# Patient Record
Sex: FEMALE | Race: BLACK OR AFRICAN AMERICAN | NOT HISPANIC OR LATINO | Employment: UNEMPLOYED | URBAN - METROPOLITAN AREA
[De-identification: names, ages, dates, MRNs, and addresses within clinical notes are randomized per-mention and may not be internally consistent; named-entity substitution may affect disease eponyms.]

---

## 2017-08-04 ENCOUNTER — TELEPHONE (OUTPATIENT)
Dept: OBSTETRICS AND GYNECOLOGY | Facility: CLINIC | Age: 52
End: 2017-08-04

## 2017-08-04 DIAGNOSIS — Z12.31 VISIT FOR SCREENING MAMMOGRAM: Primary | ICD-10-CM

## 2017-08-04 NOTE — TELEPHONE ENCOUNTER
Pt is calling for mammogram orders . I placed the orders in the system and I will call the pt to schedule the apt.

## 2017-08-04 NOTE — TELEPHONE ENCOUNTER
----- Message from Matthew Whittington sent at 8/4/2017  1:31 PM CDT -----  Contact: Pt  X_ 1st Request  _ 2nd Request  _ 3rd Request    Who: JACQUES,PHOENESHIA L [[id number]]    Why: Patient would like to have her Mammogram orders put in    What Number to Call Back: [phone]    When to Expect a call back: (Before the end of the day)  -- if call after 3:00 call back will be tomorrow.

## 2017-09-12 ENCOUNTER — HOSPITAL ENCOUNTER (OUTPATIENT)
Dept: RADIOLOGY | Facility: OTHER | Age: 52
Discharge: HOME | End: 2017-09-12
Attending: OBSTETRICS & GYNECOLOGY
Payer: COMMERCIAL

## 2017-09-12 VITALS — WEIGHT: 176 LBS | BODY MASS INDEX: 30.05 KG/M2 | HEIGHT: 64 IN

## 2017-09-12 DIAGNOSIS — Z12.31 VISIT FOR SCREENING MAMMOGRAM: ICD-10-CM

## 2017-09-12 PROCEDURE — 77063 BREAST TOMOSYNTHESIS BI: CPT | Mod: 26,,, | Performed by: RADIOLOGY

## 2017-09-12 PROCEDURE — 77067 SCR MAMMO BI INCL CAD: CPT | Mod: 26,,, | Performed by: RADIOLOGY

## 2017-09-12 PROCEDURE — 77067 SCR MAMMO BI INCL CAD: CPT | Mod: TC

## 2017-12-07 ENCOUNTER — OFFICE VISIT (OUTPATIENT)
Dept: OBSTETRICS AND GYNECOLOGY | Facility: CLINIC | Age: 52
End: 2017-12-07
Payer: COMMERCIAL

## 2017-12-07 VITALS
HEIGHT: 64 IN | SYSTOLIC BLOOD PRESSURE: 110 MMHG | WEIGHT: 170 LBS | BODY MASS INDEX: 29.02 KG/M2 | DIASTOLIC BLOOD PRESSURE: 70 MMHG

## 2017-12-07 DIAGNOSIS — Z12.31 VISIT FOR SCREENING MAMMOGRAM: Primary | ICD-10-CM

## 2017-12-07 DIAGNOSIS — F41.8 DEPRESSION WITH ANXIETY: ICD-10-CM

## 2017-12-07 DIAGNOSIS — Z01.419 ENCOUNTER FOR GYNECOLOGICAL EXAMINATION WITHOUT ABNORMAL FINDING: ICD-10-CM

## 2017-12-07 PROCEDURE — 99396 PREV VISIT EST AGE 40-64: CPT | Mod: S$GLB,,, | Performed by: OBSTETRICS & GYNECOLOGY

## 2017-12-07 PROCEDURE — 99999 PR PBB SHADOW E&M-EST. PATIENT-LVL III: CPT | Mod: PBBFAC,,, | Performed by: OBSTETRICS & GYNECOLOGY

## 2017-12-07 RX ORDER — MELOXICAM 15 MG/1
TABLET ORAL
Refills: 0 | COMMUNITY
Start: 2017-10-28 | End: 2021-06-17

## 2017-12-07 NOTE — PROGRESS NOTES
HISTORY OF PRESENT ILLNESS:    Phoeneshia L Jacques is a 52 y.o. female, , Patient's last menstrual period was 2017.,  presents for a routine exam and has no complaints.  Cycles every 1-3 months, lasting 5-7 days using 3-4 pads per day.   Patient she does reports history uterine fibroids.     Past Medical History:   Diagnosis Date    Anxiety     Asthma     Depression        Past Surgical History:   Procedure Laterality Date    BREAST BIOPSY      essure      TUBAL LIGATION       MEDICATIONS AND ALLERGIES:    Current Outpatient Prescriptions:     hydrocodone-acetaminophen 5-325mg (NORCO) 5-325 mg per tablet, Take 1 tablet by mouth every 6 (six) hours as needed for Pain., Disp: 45 tablet, Rfl: 0    meloxicam (MOBIC) 15 MG tablet, TK 1 T PO ONCE D WITH FOOD., Disp: , Rfl: 0    gabapentin (NEURONTIN) 300 MG capsule, Take 1 capsule (300 mg total) by mouth 2 (two) times daily., Disp: 60 capsule, Rfl: 3    No Known Allergies    Family History   Problem Relation Age of Onset    Depression Mother     Bipolar disorder Mother     Schizophrenia Mother     Heart disease Mother     Hypertension Mother     Cancer Paternal Aunt        Social History     Social History    Marital status:      Spouse name: N/A    Number of children: 4    Years of education: N/A     Occupational History      Not Employed     Social History Main Topics    Smoking status: Never Smoker    Smokeless tobacco: Never Used    Alcohol use No    Drug use: No    Sexual activity: Yes     Partners: Male     Other Topics Concern    Not on file     Social History Narrative    No narrative on file       COMPREHENSIVE GYN HISTORY:  PAP History: Denies abnormal Paps.  Infection History: Denies STDs. Denies PID.  Benign History: Denies uterine fibroids. Denies ovarian cysts. Denies endometriosis. Denies other conditions.  Cancer History: Denies cervical cancer. Denies uterine cancer or hyperplasia. Denies ovarian cancer. Denies  "vulvar cancer or pre-cancer. Denies vaginal cancer or pre-cancer. Denies breast cancer. Denies colon cancer.  Sexual Activity History: Reports currently being sexually active  Menstrual History: Monthly. Mod then light flow.   Dysmenorrhea History: Reports mild dysmenorrhea.     ROS:  GENERAL: No weight changes. No swelling. No fatigue. No fever.  CARDIOVASCULAR: No chest pain. No shortness of breath. No leg cramps.   NEUROLOGICAL: No headaches. No vision changes.  BREASTS: No pain. No lumps. No discharge.  ABDOMEN: No pain. No nausea. No vomiting. No diarrhea. No constipation.  REPRODUCTIVE: No abnormal bleeding.   VULVA: No pain. No lesions. No itching.  VAGINA: No relaxation. No itching. No odor. No discharge. No lesions.  URINARY: No incontinence. No nocturia. No frequency. No dysuria.    /70   Ht 5' 4" (1.626 m)   Wt 77.1 kg (170 lb)   LMP 08/28/2017   BMI 29.18 kg/m²   PE:  APPEARANCE: Well nourished, well developed, in no acute distress.  AFFECT: WNL, alert and oriented x 3.  SKIN: No acne or hirsutism.  NECK: Neck symmetric, without masses or thyromegaly.  NODES: No inguinal, cervical, axillary or femoral lymph node enlargement.  CHEST: Good respiratory effort.   ABDOMEN: Soft. No tenderness or masses. No hepatosplenomegaly. No hernias.  BREASTS: Symmetrical, no skin changes, visible lesions, palpable masses or nipple discharge bilaterally.  PELVIC: External female genitalia without lesions.  Female hair distribution. Adequate perineal body, Normal urethral meatus. Vagina moist and well rugated without lesions or discharge.  No significant cystocele or rectocele present. Cervix pink without lesions, discharge or tenderness. Uterus is 10 week size, regular, mobile and nontender. Adnexa without masses or tenderness.  EXTREMITIES: No edema    DIAGNOSIS:  1. Visit for screening mammogram    2. Encounter for gynecological examination without abnormal finding    3. Depression with anxiety      PLAN:     "     COUNSELING:  The patient was counseled today on:  -A.C.S. Pap and pelvic exam guidelines (pap every 3 years), recomendations for yearly mammogram;  -to follow up with her PCP for other health maintenance.    FOLLOW-UP with me annually.

## 2018-10-08 ENCOUNTER — TELEPHONE (OUTPATIENT)
Dept: OBSTETRICS AND GYNECOLOGY | Facility: CLINIC | Age: 53
End: 2018-10-08

## 2018-10-08 DIAGNOSIS — Z12.31 VISIT FOR SCREENING MAMMOGRAM: Primary | ICD-10-CM

## 2018-10-08 NOTE — TELEPHONE ENCOUNTER
----- Message from Eitan Chan MA sent at 10/8/2018  2:51 PM CDT -----  Contact: Self  Pt is requesting mmg orders.  The pt can be reached at [phone].  Thanks [place]

## 2018-10-16 ENCOUNTER — HOSPITAL ENCOUNTER (OUTPATIENT)
Dept: RADIOLOGY | Facility: OTHER | Age: 53
Discharge: HOME | End: 2018-10-16
Attending: OBSTETRICS & GYNECOLOGY
Payer: COMMERCIAL

## 2018-10-16 DIAGNOSIS — Z12.31 VISIT FOR SCREENING MAMMOGRAM: ICD-10-CM

## 2018-10-16 PROCEDURE — 77067 SCR MAMMO BI INCL CAD: CPT | Mod: 26,,, | Performed by: RADIOLOGY

## 2018-10-16 PROCEDURE — 77063 BREAST TOMOSYNTHESIS BI: CPT | Mod: 26,,, | Performed by: RADIOLOGY

## 2018-10-16 PROCEDURE — 77063 BREAST TOMOSYNTHESIS BI: CPT | Mod: TC

## 2019-04-02 ENCOUNTER — HOSPITAL ENCOUNTER (EMERGENCY)
Facility: OTHER | Age: 54
Discharge: HOME | End: 2019-04-02
Attending: EMERGENCY MEDICINE
Payer: COMMERCIAL

## 2019-04-02 VITALS
BODY MASS INDEX: 28.51 KG/M2 | SYSTOLIC BLOOD PRESSURE: 121 MMHG | RESPIRATION RATE: 14 BRPM | DIASTOLIC BLOOD PRESSURE: 70 MMHG | WEIGHT: 167 LBS | HEIGHT: 64 IN | TEMPERATURE: 99 F | OXYGEN SATURATION: 100 % | HEART RATE: 70 BPM

## 2019-04-02 DIAGNOSIS — M25.552 HIP PAIN, ACUTE, LEFT: ICD-10-CM
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
## 2019-04-02 PROCEDURE — 63600175 PHARM REV CODE 636 W HCPCS: Performed by: EMERGENCY MEDICINE

## 2019-04-02 PROCEDURE — 96372 THER/PROPH/DIAG INJ SC/IM: CPT

## 2019-04-02 PROCEDURE — 99284 EMERGENCY DEPT VISIT MOD MDM: CPT | Mod: 25

## 2019-04-02 RX ORDER — ACETAMINOPHEN AND CODEINE PHOSPHATE 300; 30 MG/1; MG/1
TABLET ORAL
COMMUNITY
End: 2021-06-17

## 2019-04-02 RX ORDER — POTASSIUM CHLORIDE 750 MG/1
20 TABLET, EXTENDED RELEASE ORAL 2 TIMES DAILY
COMMUNITY
End: 2022-05-25

## 2019-04-02 RX ORDER — ZOLPIDEM TARTRATE 10 MG/1
5 TABLET ORAL NIGHTLY PRN
COMMUNITY
End: 2021-06-17

## 2019-04-02 RX ORDER — HYDROCHLOROTHIAZIDE 25 MG/1
25 TABLET ORAL DAILY
COMMUNITY
End: 2022-05-25

## 2019-04-02 RX ORDER — KETOROLAC TROMETHAMINE 30 MG/ML
15 INJECTION, SOLUTION INTRAMUSCULAR; INTRAVENOUS
Status: COMPLETED | OUTPATIENT
Start: 2019-04-02 | End: 2019-04-02

## 2019-04-02 RX ADMIN — KETOROLAC TROMETHAMINE 15 MG: 30 INJECTION, SOLUTION INTRAMUSCULAR at 04:04
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
## 2019-04-02 NOTE — ED NOTES
"Pt able to bear full weight on left leg and ambulate with cane with minimal pain. States "its just sore". Pt updated on xray results.   "
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
## 2019-04-02 NOTE — ED NOTES
Dr. Conner cleared pt of c-spine precautions, MD removed c collar. Pt tolerated well. Able to move neck freely.

## 2019-11-07 LAB — CRC RECOMMENDATION EXT: NORMAL

## 2020-01-30 ENCOUNTER — TELEPHONE (OUTPATIENT)
Dept: OBSTETRICS AND GYNECOLOGY | Facility: CLINIC | Age: 55
End: 2020-01-30

## 2020-01-30 NOTE — TELEPHONE ENCOUNTER
----- Message from Tabitha Buck sent at 1/30/2020 12:22 PM CST -----  Contact: pt  Name of Who is Calling: pt    What is the request in detail: is needing a referral for a PCP for her daughter. Please contact to further discuss and advise      Can the clinic reply by [facility]: no    What [facility]: [phone]

## 2020-01-30 NOTE — TELEPHONE ENCOUNTER
I spoke to the pt and gave her a few names of PCPs that she can call to schedule an appt for her daughter.

## 2020-06-17 ENCOUNTER — TELEPHONE (OUTPATIENT)
Dept: OBSTETRICS AND GYNECOLOGY | Facility: CLINIC | Age: 55
End: 2020-06-17

## 2020-06-17 NOTE — TELEPHONE ENCOUNTER
----- Message from Lavern Aaron sent at 6/17/2020 10:15 AM CDT -----  Regarding: wants to reschedule  Type: Patient Call Back    Who called:pt    What is the request in detail:pt is calling to reschedule appt. Nothing available to schedule. Call pt    Can the clinic reply by [facility]?    Would the patient rather a call back or a response via [facility]? call    Best call back number:[phone] (home)       Additional Information:

## 2020-12-16 ENCOUNTER — TELEPHONE (OUTPATIENT)
Dept: OBSTETRICS AND GYNECOLOGY | Facility: CLINIC | Age: 55
End: 2020-12-16

## 2020-12-16 NOTE — TELEPHONE ENCOUNTER
----- Message from Yuly Velazquez sent at 12/16/2020  2:10 PM CST -----      Name of Who is Calling: JACQUES, PHOENESHIA L [[id number]]      What is the request in detail: Pt called to schedule WWE.Please contact to further discuss and advise.          Can the clinic reply by [facility]: N      [facility]: [phone]

## 2021-06-17 ENCOUNTER — OFFICE VISIT (OUTPATIENT)
Dept: OBSTETRICS AND GYNECOLOGY | Facility: CLINIC | Age: 56
End: 2021-06-17
Payer: COMMERCIAL

## 2021-06-17 VITALS
WEIGHT: 189.38 LBS | SYSTOLIC BLOOD PRESSURE: 120 MMHG | BODY MASS INDEX: 32.33 KG/M2 | DIASTOLIC BLOOD PRESSURE: 60 MMHG | HEIGHT: 64 IN

## 2021-06-17 DIAGNOSIS — F41.8 DEPRESSION WITH ANXIETY: ICD-10-CM

## 2021-06-17 DIAGNOSIS — Z12.31 VISIT FOR SCREENING MAMMOGRAM: Primary | ICD-10-CM

## 2021-06-17 DIAGNOSIS — Z01.419 ENCOUNTER FOR GYNECOLOGICAL EXAMINATION WITHOUT ABNORMAL FINDING: ICD-10-CM

## 2021-06-17 PROBLEM — M16.12 PRIMARY OSTEOARTHRITIS OF LEFT HIP: Status: ACTIVE | Noted: 2018-09-24

## 2021-06-17 PROBLEM — G44.209 TENSION TYPE HEADACHE: Status: ACTIVE | Noted: 2020-05-07

## 2021-06-17 PROBLEM — I10 ESSENTIAL HYPERTENSION: Status: ACTIVE | Noted: 2018-09-24

## 2021-06-17 PROBLEM — E78.2 MIXED HYPERLIPIDEMIA: Status: ACTIVE | Noted: 2018-10-03

## 2021-06-17 PROCEDURE — 1126F PR PAIN SEVERITY QUANTIFIED, NO PAIN PRESENT: ICD-10-PCS | Mod: S$GLB,,, | Performed by: OBSTETRICS & GYNECOLOGY

## 2021-06-17 PROCEDURE — 99396 PR PREVENTIVE VISIT,EST,40-64: ICD-10-PCS | Mod: S$GLB,,, | Performed by: OBSTETRICS & GYNECOLOGY

## 2021-06-17 PROCEDURE — 3008F PR BODY MASS INDEX (BMI) DOCUMENTED: ICD-10-PCS | Mod: CPTII,S$GLB,, | Performed by: OBSTETRICS & GYNECOLOGY

## 2021-06-17 PROCEDURE — 99999 PR PBB SHADOW E&M-EST. PATIENT-LVL III: ICD-10-PCS | Mod: PBBFAC,,, | Performed by: OBSTETRICS & GYNECOLOGY

## 2021-06-17 PROCEDURE — 99999 PR PBB SHADOW E&M-EST. PATIENT-LVL III: CPT | Mod: PBBFAC,,, | Performed by: OBSTETRICS & GYNECOLOGY

## 2021-06-17 PROCEDURE — 1126F AMNT PAIN NOTED NONE PRSNT: CPT | Mod: S$GLB,,, | Performed by: OBSTETRICS & GYNECOLOGY

## 2021-06-17 PROCEDURE — 87624 HPV HI-RISK TYP POOLED RSLT: CPT | Performed by: OBSTETRICS & GYNECOLOGY

## 2021-06-17 PROCEDURE — 99396 PREV VISIT EST AGE 40-64: CPT | Mod: S$GLB,,, | Performed by: OBSTETRICS & GYNECOLOGY

## 2021-06-17 PROCEDURE — 88175 CYTOPATH C/V AUTO FLUID REDO: CPT | Performed by: OBSTETRICS & GYNECOLOGY

## 2021-06-17 PROCEDURE — 3008F BODY MASS INDEX DOCD: CPT | Mod: CPTII,S$GLB,, | Performed by: OBSTETRICS & GYNECOLOGY

## 2021-06-17 RX ORDER — ACETAMINOPHEN 500 MG
15 TABLET ORAL
COMMUNITY
End: 2022-07-12

## 2021-06-17 RX ORDER — ACETAMINOPHEN 500 MG
TABLET ORAL
COMMUNITY

## 2021-06-17 RX ORDER — DICLOFENAC SODIUM 75 MG/1
75 TABLET, DELAYED RELEASE ORAL
COMMUNITY
End: 2021-06-17

## 2021-06-17 RX ORDER — TIZANIDINE 4 MG/1
4 TABLET ORAL
COMMUNITY
Start: 2021-03-23 | End: 2022-05-25

## 2021-06-17 RX ORDER — HYDROCHLOROTHIAZIDE 25 MG/1
25 TABLET ORAL
COMMUNITY
Start: 2021-04-08 | End: 2021-06-17

## 2021-06-17 RX ORDER — TIZANIDINE 4 MG/1
4 TABLET ORAL EVERY 8 HOURS PRN
COMMUNITY
Start: 2021-05-07 | End: 2021-06-17

## 2021-06-17 RX ORDER — POTASSIUM CHLORIDE 20 MEQ/1
20 TABLET, EXTENDED RELEASE ORAL
COMMUNITY
Start: 2021-05-21 | End: 2021-06-17

## 2021-06-17 RX ORDER — SERTRALINE HYDROCHLORIDE 100 MG/1
100 TABLET, FILM COATED ORAL
COMMUNITY
Start: 2021-05-20 | End: 2022-05-25

## 2021-06-17 RX ORDER — SERTRALINE HYDROCHLORIDE 100 MG/1
100 TABLET, FILM COATED ORAL DAILY
COMMUNITY
Start: 2021-05-20 | End: 2021-06-17

## 2021-06-22 ENCOUNTER — TELEPHONE (OUTPATIENT)
Dept: OBSTETRICS AND GYNECOLOGY | Facility: CLINIC | Age: 56
End: 2021-06-22

## 2021-06-24 LAB
FINAL PATHOLOGIC DIAGNOSIS: NORMAL
Lab: NORMAL

## 2021-06-25 LAB
HPV HR 12 DNA SPEC QL NAA+PROBE: NEGATIVE
HPV16 AG SPEC QL: NEGATIVE
HPV18 DNA SPEC QL NAA+PROBE: NEGATIVE

## 2021-12-29 ENCOUNTER — LAB VISIT (OUTPATIENT)
Dept: PRIMARY CARE CLINIC | Facility: OTHER | Age: 56
End: 2021-12-29
Payer: COMMERCIAL

## 2021-12-29 DIAGNOSIS — Z20.822 ENCOUNTER FOR LABORATORY TESTING FOR COVID-19 VIRUS: ICD-10-CM

## 2021-12-29 PROCEDURE — U0003 INFECTIOUS AGENT DETECTION BY NUCLEIC ACID (DNA OR RNA); SEVERE ACUTE RESPIRATORY SYNDROME CORONAVIRUS 2 (SARS-COV-2) (CORONAVIRUS DISEASE [COVID-19]), AMPLIFIED PROBE TECHNIQUE, MAKING USE OF HIGH THROUGHPUT TECHNOLOGIES AS DESCRIBED BY CMS-2020-01-R: HCPCS | Performed by: INTERNAL MEDICINE

## 2022-01-01 LAB
SARS-COV-2 RNA RESP QL NAA+PROBE: DETECTED
TEST PERFORMANCE INFO SPEC: ABNORMAL

## 2022-01-03 DIAGNOSIS — U07.1 COVID-19 VIRUS DETECTED: ICD-10-CM

## 2022-02-22 ENCOUNTER — PATIENT MESSAGE (OUTPATIENT)
Dept: RESEARCH | Facility: HOSPITAL | Age: 57
End: 2022-02-22
Payer: COMMERCIAL

## 2022-04-06 ENCOUNTER — PATIENT MESSAGE (OUTPATIENT)
Dept: PSYCHIATRY | Facility: CLINIC | Age: 57
End: 2022-04-06
Payer: COMMERCIAL

## 2022-04-06 ENCOUNTER — TELEPHONE (OUTPATIENT)
Dept: PSYCHIATRY | Facility: CLINIC | Age: 57
End: 2022-04-06
Payer: COMMERCIAL

## 2022-04-06 NOTE — TELEPHONE ENCOUNTER
Attempt to contact the patient to inform 4/29 has to be r/s, provider won't be in clinic. Portal message sent to the patient

## 2022-05-25 ENCOUNTER — OFFICE VISIT (OUTPATIENT)
Dept: PSYCHIATRY | Facility: CLINIC | Age: 57
End: 2022-05-25
Payer: COMMERCIAL

## 2022-05-25 VITALS
HEART RATE: 81 BPM | HEIGHT: 64 IN | SYSTOLIC BLOOD PRESSURE: 129 MMHG | WEIGHT: 188.06 LBS | DIASTOLIC BLOOD PRESSURE: 88 MMHG | BODY MASS INDEX: 32.11 KG/M2

## 2022-05-25 DIAGNOSIS — Z62.820 PARENT-CHILD RELATIONAL PROBLEM: ICD-10-CM

## 2022-05-25 DIAGNOSIS — F40.10 SOCIAL ANXIETY DISORDER: ICD-10-CM

## 2022-05-25 DIAGNOSIS — F41.1 GENERALIZED ANXIETY DISORDER: ICD-10-CM

## 2022-05-25 DIAGNOSIS — F33.2 SEVERE EPISODE OF RECURRENT MAJOR DEPRESSIVE DISORDER, WITHOUT PSYCHOTIC FEATURES: Primary | ICD-10-CM

## 2022-05-25 PROCEDURE — 3079F DIAST BP 80-89 MM HG: CPT | Mod: CPTII,S$GLB,, | Performed by: NURSE PRACTITIONER

## 2022-05-25 PROCEDURE — 3079F PR MOST RECENT DIASTOLIC BLOOD PRESSURE 80-89 MM HG: ICD-10-PCS | Mod: CPTII,S$GLB,, | Performed by: NURSE PRACTITIONER

## 2022-05-25 PROCEDURE — 1160F RVW MEDS BY RX/DR IN RCRD: CPT | Mod: CPTII,S$GLB,, | Performed by: NURSE PRACTITIONER

## 2022-05-25 PROCEDURE — 90792 PSYCH DIAG EVAL W/MED SRVCS: CPT | Mod: S$GLB,,, | Performed by: NURSE PRACTITIONER

## 2022-05-25 PROCEDURE — 3074F SYST BP LT 130 MM HG: CPT | Mod: CPTII,S$GLB,, | Performed by: NURSE PRACTITIONER

## 2022-05-25 PROCEDURE — 90792 PR PSYCHIATRIC DIAGNOSTIC EVALUATION W/MEDICAL SERVICES: ICD-10-PCS | Mod: S$GLB,,, | Performed by: NURSE PRACTITIONER

## 2022-05-25 PROCEDURE — 99999 PR PBB SHADOW E&M-EST. PATIENT-LVL III: CPT | Mod: PBBFAC,,, | Performed by: NURSE PRACTITIONER

## 2022-05-25 PROCEDURE — 3008F PR BODY MASS INDEX (BMI) DOCUMENTED: ICD-10-PCS | Mod: CPTII,S$GLB,, | Performed by: NURSE PRACTITIONER

## 2022-05-25 PROCEDURE — 3008F BODY MASS INDEX DOCD: CPT | Mod: CPTII,S$GLB,, | Performed by: NURSE PRACTITIONER

## 2022-05-25 PROCEDURE — 3074F PR MOST RECENT SYSTOLIC BLOOD PRESSURE < 130 MM HG: ICD-10-PCS | Mod: CPTII,S$GLB,, | Performed by: NURSE PRACTITIONER

## 2022-05-25 PROCEDURE — 99999 PR PBB SHADOW E&M-EST. PATIENT-LVL III: ICD-10-PCS | Mod: PBBFAC,,, | Performed by: NURSE PRACTITIONER

## 2022-05-25 PROCEDURE — 1159F MED LIST DOCD IN RCRD: CPT | Mod: CPTII,S$GLB,, | Performed by: NURSE PRACTITIONER

## 2022-05-25 PROCEDURE — 1160F PR REVIEW ALL MEDS BY PRESCRIBER/CLIN PHARMACIST DOCUMENTED: ICD-10-PCS | Mod: CPTII,S$GLB,, | Performed by: NURSE PRACTITIONER

## 2022-05-25 PROCEDURE — 1159F PR MEDICATION LIST DOCUMENTED IN MEDICAL RECORD: ICD-10-PCS | Mod: CPTII,S$GLB,, | Performed by: NURSE PRACTITIONER

## 2022-05-25 RX ORDER — ERGOCALCIFEROL 1.25 MG/1
50000 CAPSULE ORAL
Qty: 12 CAPSULE | Refills: 0 | Status: SHIPPED | OUTPATIENT
Start: 2022-05-25 | End: 2022-08-23

## 2022-05-25 RX ORDER — VENLAFAXINE HYDROCHLORIDE 75 MG/1
75 CAPSULE, EXTENDED RELEASE ORAL DAILY
COMMUNITY
Start: 2022-05-12 | End: 2022-05-25

## 2022-05-25 RX ORDER — VENLAFAXINE HYDROCHLORIDE 150 MG/1
150 CAPSULE, EXTENDED RELEASE ORAL DAILY
Qty: 30 CAPSULE | Refills: 1 | Status: SHIPPED | OUTPATIENT
Start: 2022-05-25 | End: 2022-07-12 | Stop reason: SDUPTHER

## 2022-05-25 RX ORDER — HYDROCHLOROTHIAZIDE 12.5 MG/1
1 TABLET ORAL DAILY
COMMUNITY
Start: 2022-05-17 | End: 2022-11-09 | Stop reason: SDUPTHER
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
## 2022-07-12 ENCOUNTER — OFFICE VISIT (OUTPATIENT)
Dept: PSYCHIATRY | Facility: CLINIC | Age: 57
End: 2022-07-12
Payer: COMMERCIAL

## 2022-07-12 VITALS
HEIGHT: 64 IN | WEIGHT: 187.81 LBS | BODY MASS INDEX: 32.06 KG/M2 | SYSTOLIC BLOOD PRESSURE: 111 MMHG | HEART RATE: 87 BPM | DIASTOLIC BLOOD PRESSURE: 71 MMHG

## 2022-07-12 DIAGNOSIS — F40.10 SOCIAL ANXIETY DISORDER: ICD-10-CM

## 2022-07-12 DIAGNOSIS — Z62.820 PARENT-CHILD RELATIONAL PROBLEM: ICD-10-CM

## 2022-07-12 DIAGNOSIS — F33.2 SEVERE EPISODE OF RECURRENT MAJOR DEPRESSIVE DISORDER, WITHOUT PSYCHOTIC FEATURES: Primary | ICD-10-CM

## 2022-07-12 DIAGNOSIS — F41.1 GENERALIZED ANXIETY DISORDER: ICD-10-CM

## 2022-07-12 PROCEDURE — 99214 PR OFFICE/OUTPT VISIT, EST, LEVL IV, 30-39 MIN: ICD-10-PCS | Mod: S$GLB,,, | Performed by: NURSE PRACTITIONER

## 2022-07-12 PROCEDURE — 3078F PR MOST RECENT DIASTOLIC BLOOD PRESSURE < 80 MM HG: ICD-10-PCS | Mod: CPTII,S$GLB,, | Performed by: NURSE PRACTITIONER

## 2022-07-12 PROCEDURE — 3074F PR MOST RECENT SYSTOLIC BLOOD PRESSURE < 130 MM HG: ICD-10-PCS | Mod: CPTII,S$GLB,, | Performed by: NURSE PRACTITIONER

## 2022-07-12 PROCEDURE — 3008F PR BODY MASS INDEX (BMI) DOCUMENTED: ICD-10-PCS | Mod: CPTII,S$GLB,, | Performed by: NURSE PRACTITIONER

## 2022-07-12 PROCEDURE — 3078F DIAST BP <80 MM HG: CPT | Mod: CPTII,S$GLB,, | Performed by: NURSE PRACTITIONER

## 2022-07-12 PROCEDURE — 99999 PR PBB SHADOW E&M-EST. PATIENT-LVL III: ICD-10-PCS | Mod: PBBFAC,,, | Performed by: NURSE PRACTITIONER

## 2022-07-12 PROCEDURE — 3074F SYST BP LT 130 MM HG: CPT | Mod: CPTII,S$GLB,, | Performed by: NURSE PRACTITIONER

## 2022-07-12 PROCEDURE — 99999 PR PBB SHADOW E&M-EST. PATIENT-LVL III: CPT | Mod: PBBFAC,,, | Performed by: NURSE PRACTITIONER

## 2022-07-12 PROCEDURE — 3008F BODY MASS INDEX DOCD: CPT | Mod: CPTII,S$GLB,, | Performed by: NURSE PRACTITIONER

## 2022-07-12 PROCEDURE — 99214 OFFICE O/P EST MOD 30 MIN: CPT | Mod: S$GLB,,, | Performed by: NURSE PRACTITIONER

## 2022-07-12 RX ORDER — VENLAFAXINE HYDROCHLORIDE 150 MG/1
150 CAPSULE, EXTENDED RELEASE ORAL DAILY
Qty: 90 CAPSULE | Refills: 3 | Status: SHIPPED | OUTPATIENT
Start: 2022-07-12 | End: 2022-09-21 | Stop reason: SDUPTHER

## 2022-07-12 RX ORDER — VENLAFAXINE HYDROCHLORIDE 75 MG/1
75 CAPSULE, EXTENDED RELEASE ORAL DAILY
Qty: 90 CAPSULE | Refills: 3 | Status: SHIPPED | OUTPATIENT
Start: 2022-07-12 | End: 2022-09-21 | Stop reason: SDUPTHER

## 2022-07-12 RX ORDER — BUPROPION HYDROCHLORIDE 150 MG/1
150 TABLET ORAL DAILY
Qty: 30 TABLET | Refills: 1 | Status: SHIPPED | OUTPATIENT
Start: 2022-07-12 | End: 2022-09-21 | Stop reason: DRUGHIGH

## 2022-07-12 NOTE — PROGRESS NOTES
"Outpatient Psychiatry Follow-Up Visit (MD/NP)    7/12/2022    Clinical Status of Patient:  Outpatient (Ambulatory)    Chief Complaint:  Phoeneshia L Jacques is a 56 y.o. female who presents today for follow-up of depression, anxiety and relational problem.  Pt last seen on 05/25/22 for initial evaluation.  Met with patient.      Interval History and Content of Current Session:  Interim Events/Subjective Report/Content of Current Session:  · Medication Management:  ? Increase Effexor XR to 150 mg Q AM  ? Continue OTC melatonin 3 mg Q HS PRN  ? Start Vit D 17530 iu Q 7 days prophylactically  · The treatment plan and follow up plan were reviewed with the patient.   · Lifestyle modifications dicussed.  Pt encouraged to eat a healthier diet and start walking outside for exercise (30 min 3x/week).    Pt reports that she increased her Effexor XR to 225 mg x 3 weeks.  She complains of weight gain "I'm always eating... I feel fat"  She reports negative thinking "If things are going well, I feel like something bad will happen"  She complains of being forgetful and having decreased concentration due to "my thoughts always interrupting me"  Pt reports having a hard time getting up in the mornings even though she sleeps well at night.  She reports decreased libido since menopause.    Pt has actually been maintaining weight for the last year per chart.  She denies exercising.    PHQ-9 Total Score: 15  Interpretation: Moderately Severe     RALPH-7 Score: 11  Interpretation: Moderate Anxiety     Psychotherapy:  · Target symptoms: depression, anxiety   · Why chosen therapy is appropriate versus another modality: relevant to diagnosis, patient responds to this modality, evidence based practice  · Outcome monitoring methods: self-report, observation, checklist/rating scale  · Therapeutic intervention type: insight oriented psychotherapy, behavior modifying psychotherapy, supportive psychotherapy  · Topics discussed/themes: relationships " difficulties, parenting issues, building skills sets for symptom management, symptom recognition  · The patient's response to the intervention is accepting. The patient's progress toward treatment goals is good.   · Duration of intervention: 18 minutes.    Review of Systems     Psychiatric Review Of Systems - Is patient experiencing or having changes in:  sleep: no  appetite: yes, increased  weight: no  energy/anergy: yes, improving  interest/pleasure/anhedonia: yes  somatic symptoms: no  libido: yes  anxiety/panic: yes  guilty/hopelessness: yes, guilt about kids and [place]  concentration: yes  S.I.B.s/risky behavior: no  Irritability: yes  Racing thoughts: yes  Impulsive behaviors: no  Paranoia: no  AVH: no     Review of Systems   HENT: Negative for hearing loss.    Eyes: Negative for visual disturbance.   Respiratory: Negative for chest tightness and shortness of breath.    Cardiovascular: Negative for chest pain and palpitations.   Gastrointestinal: Negative for abdominal pain, nausea and vomiting.   Endocrine: Negative for cold intolerance and heat intolerance.   Genitourinary: Negative for dysuria and hematuria.   Musculoskeletal: Positive for arthralgias (bilateral hips) and leg pain. Negative for gait problem and myalgias.   Integumentary:  Negative for color change and rash.   Neurological: Negative for dizziness, tremors, seizures and headaches.        Past Medical, Family and Social History: The patient's past medical, family and social history have been reviewed and updated as appropriate within the electronic medical record - see encounter notes.    Compliance: yes    Side effects: None    Risk Parameters:  Patient reports no suicidal ideation  Patient reports no homicidal ideation  Patient reports no self-injurious behavior  Patient reports no violent behavior    Exam (detailed: at least 9 elements; comprehensive: all 15 elements)   Constitutional  Vitals:  Most recent vital signs, dated greater than 90  "days prior to this appointment, were reviewed.    Vitals:    07/12/22 0904   BP: 111/71   Pulse: 87   Weight: 85.2 kg (187 lb 13.3 oz)   Height: 5' 4" (1.626 m)        General:  unremarkable, age appropriate, casually dressed, neatly groomed, obese     Musculoskeletal  Muscle Strength/Tone:  no tremor, no tic   Gait & Station:  non-ataxic     Psychiatric  Speech:  no latency; no press, spontaneous   Mood & Affect:  steady  congruent and appropriate   Thought Process:  normal and logical, goal-directed   Associations:  intact   Thought Content:  normal, no suicidality, no homicidality, delusions, or paranoia   Insight:  intact, has awareness of illness   Judgement: behavior is adequate to circumstances, age appropriate   Orientation:  grossly intact, person, place, situation, day of week, month of year, year   Memory: intact for content of interview, able to remember recent events- yes, able to remember remote events- yes    Language: grossly intact, able to name, able to repeat    Attention Span & Concentration:  able to focus, completed tasks   Fund of Knowledge:  intact and appropriate to age and level of education, 4 of 4 recent presidents       Assessment and Diagnosis   General Impression: Phoeneshia L Jacques is a 56 y.o. female presenting for follow-up and management of depression and anxiety.     Status/Progress: Based on the examination today, the patient's problem(s) is/are resolving.  New problems have not been presented today.   No obstacles are complicating management of the primary condition.  There are no active rule-out diagnoses for this patient at this time.       ICD-10-CM ICD-9-CM   1. Severe episode of recurrent major depressive disorder, without psychotic features  F33.2 296.33   2. Generalized anxiety disorder  F41.1 300.02   3. Social anxiety disorder  F40.10 300.23   4. Parent-child relational problem  Z62.820 V61.20       Intervention/Counseling/Treatment Plan   · Medication Management: "   · Continue Effexor  mg Q AM  · Start Wellbutrin  mg Q AM  · Continue OTC melatonin 3 mg Q HS PRN  · Counseling provided with patient as follows: importance of compliance with chosen treatment options was emphasized, risks and benefits of treatment options, including medications, were discussed with the patient   · Lifestyle modifications discussed.  Pt encouraged to eat healthy snacks and exercise.      Return to Clinic: 1 month

## 2022-07-26 ENCOUNTER — OFFICE VISIT (OUTPATIENT)
Dept: PSYCHIATRY | Facility: CLINIC | Age: 57
End: 2022-07-26
Payer: COMMERCIAL

## 2022-07-26 DIAGNOSIS — F41.8 DEPRESSION WITH ANXIETY: Primary | ICD-10-CM

## 2022-07-26 DIAGNOSIS — F33.2 SEVERE EPISODE OF RECURRENT MAJOR DEPRESSIVE DISORDER, WITHOUT PSYCHOTIC FEATURES: ICD-10-CM

## 2022-07-26 DIAGNOSIS — F40.10 SOCIAL ANXIETY DISORDER: ICD-10-CM

## 2022-07-26 DIAGNOSIS — F41.1 GENERALIZED ANXIETY DISORDER: ICD-10-CM

## 2022-07-26 PROCEDURE — 90791 PSYCH DIAGNOSTIC EVALUATION: CPT | Mod: S$GLB,,, | Performed by: SOCIAL WORKER

## 2022-07-26 PROCEDURE — 90791 PR PSYCHIATRIC DIAGNOSTIC EVALUATION: ICD-10-PCS | Mod: S$GLB,,, | Performed by: SOCIAL WORKER

## 2022-07-26 PROCEDURE — 99999 PR PBB SHADOW E&M-EST. PATIENT-LVL II: CPT | Mod: PBBFAC,,, | Performed by: SOCIAL WORKER

## 2022-07-26 PROCEDURE — 1159F PR MEDICATION LIST DOCUMENTED IN MEDICAL RECORD: ICD-10-PCS | Mod: CPTII,S$GLB,, | Performed by: SOCIAL WORKER

## 2022-07-26 PROCEDURE — 1159F MED LIST DOCD IN RCRD: CPT | Mod: CPTII,S$GLB,, | Performed by: SOCIAL WORKER

## 2022-07-26 PROCEDURE — 99999 PR PBB SHADOW E&M-EST. PATIENT-LVL II: ICD-10-PCS | Mod: PBBFAC,,, | Performed by: SOCIAL WORKER

## 2022-08-22 NOTE — PROGRESS NOTES
"Psychiatry Initial Visit (PhD/LCSW)  Diagnostic Interview - CPT 26575    Date: 7/26/2022    Site: [facility]    Referral source: Li Laureano DNP    Clinical status of patient: Outpatient    Phoeneshia L Jacques, a 56 y.o. female, for initial evaluation visit.  Met with patient.    Chief complaint/reason for encounter: depression and anxiety  History of present illness: Phoeneshia L Jacques is a 56 y.o. female with a history of anxiety and depression who presents for initial evaluation.  Pt reports being an emotional child crying all of the time.  She states that her father was a rolling stone and wasn't there so she was raised by her grandmother and mother.  Her mother was schizophrenic with (from what pt describes) more negative symptoms; she would isolate herself often.  Pt felt like her mother didn't like her growing up so in turn she grew up not liking her mother.  Pt states that she feels she is becoming more like her mother as she gets older.  She reports having social problems with a lot of people and states that she feels anxious and awkward in social situations even with family.  "I can't get on everyone's level. When my daughters come to visit I make dinner, make their plates, and then go to my room.  They feel I'm standoffish."  Pt states that she currently doesn't know where her 33 yo daughter is because she is mad and not speaking to her.  Pt states that since her daughter was 10 yo she felt her mother wasn't loving enough and now their issues have worsened.     Pt states that she doesn't have any friends and don't have anything to do.  She just stays at home eating up everything.        She complains of symptoms of depression including diminished mood or loss of interest/anhedonia, decreased energy, difficulty with sleep, overeating, weight gain (28 lbs during COVID), decreased concentration, negative thoughts and excessive guilt and hopelessness.  Denies current SI/HI.  She admits to " symptoms of anxiety including excessive anxiety/worry/fear, more days than not, about numerous issues, difficulty controlling the worry, muscle tension, poor concentration, decreased sleep, fatigue, and increased irritability.  She denies panic attacks at this time.  Denies symptoms consistent with trauma, PTSD, OCD, psychoses, and blanca.  Denies substance abuse.     Pt lives with her  and two children, 24 yo daughter and 13 yo son.  She is a homemaker.  Family psychiatric history includes: mother - depression, schizophrenia; brother - bipolar, schizophrenia; paternal uncle - learning disability; and son - learning disability.    Pain: 0    Symptoms:   · Mood: depressed mood, diminished interest, psychomotor agitation, fatigue, worthlessness/guilt, poor concentration, tearfulness and social isolation  · Anxiety: excessive anxiety/worry, restlessness/keyed up, irritability and muscle tension  · Substance abuse: denied  · Cognitive functioning: denied  · Health behaviors: noncontributory    Psychiatric history:   Hospitalizations: Denies  Suicide Attempts: Denies  Violent Behaviors: Denies  Self-Injurious Behaviors: Denies  Blanca: Denies    Medical history: noncontributory    Family history of psychiatric illness: mother - depression, schizophrenia; brother - bipolar, schizophrenia; paternal uncle - learning disability; and son - learning disability    Social history (marriage, employment, etc.): Pt has been  for over 30 years and has not worked for several years 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  Pt reports no hobbies or interests outside of keeping up with her home and caring for her son. Her  works for [employer] drives a street car.  Pt reports she is not really close to others so her support system is limited to just her . Pt did not report any trauma hx however noted she had a tough childhood.      Functioning in  Relationships:  Spouse/partner:  is active; don't do anything with him  Peers: no friends - don't want to talk to old friends because her kids didn't graduate from college like theirs - mom feels responsible due to DNA  Employers: N/A    Substance use:   Alcohol: none   Drugs: none   Tobacco: none   Caffeine: 1-cup of coffee.     Current medications and drug reactions (include OTC, herbal): see medication list     Strengths and liabilities: Strength: Patient accepts guidance/feedback, Strength: Patient is expressive/articulate., Strength: Patient is intelligent., Strength: Patient is motivated for change., Strength: Patient is physically healthy., Strength: Patient has positive support network., Strength: Patient has reasonable judgment., Strength: Patient is stable.    Current Evaluation:     Mental Status Exam:  General Appearance:  age appropriate, casually dressed   Speech: normal tone, normal rate, normal pitch, normal volume      Level of Cooperation: cooperative      Thought Processes: normal and logical, circumstantial   Mood: anxious, depressed      Thought Content: normal, no suicidality, no homicidality, delusions, or paranoia   Affect: congruent and appropriate, flat   Orientation: Oriented x3   Memory: recent >  intact, remote >  intact   Attention Span & Concentration: intact   Fund of General Knowledge: intact and appropriate to age and level of education   Abstract Reasoning: interpretation of similarities was abstract   Judgment & Insight: good     Language  intact     Diagnostic Impression - Plan:       ICD-10-CM ICD-9-CM   1. Depression with anxiety  F41.8 300.4   2. Severe episode of recurrent major depressive disorder, without psychotic features  F33.2 296.33   3. Generalized anxiety disorder  F41.1 300.02   4. Social anxiety disorder  F40.10 300.23       Plan:individual psychotherapy and medication management by physician    Return to Clinic: 1 month    Length of Service (minutes):  60

## 2022-09-21 ENCOUNTER — OFFICE VISIT (OUTPATIENT)
Dept: PSYCHIATRY | Facility: CLINIC | Age: 57
End: 2022-09-21
Payer: COMMERCIAL

## 2022-09-21 VITALS
DIASTOLIC BLOOD PRESSURE: 58 MMHG | HEART RATE: 92 BPM | BODY MASS INDEX: 31.83 KG/M2 | WEIGHT: 185.44 LBS | SYSTOLIC BLOOD PRESSURE: 124 MMHG

## 2022-09-21 DIAGNOSIS — M79.10 MYALGIA: ICD-10-CM

## 2022-09-21 DIAGNOSIS — F33.2 SEVERE EPISODE OF RECURRENT MAJOR DEPRESSIVE DISORDER, WITHOUT PSYCHOTIC FEATURES: Primary | ICD-10-CM

## 2022-09-21 DIAGNOSIS — R53.83 FATIGUE, UNSPECIFIED TYPE: ICD-10-CM

## 2022-09-21 DIAGNOSIS — F41.1 GENERALIZED ANXIETY DISORDER: ICD-10-CM

## 2022-09-21 DIAGNOSIS — F40.10 SOCIAL ANXIETY DISORDER: ICD-10-CM

## 2022-09-21 PROCEDURE — 3074F PR MOST RECENT SYSTOLIC BLOOD PRESSURE < 130 MM HG: ICD-10-PCS | Mod: CPTII,S$GLB,, | Performed by: NURSE PRACTITIONER

## 2022-09-21 PROCEDURE — 3008F PR BODY MASS INDEX (BMI) DOCUMENTED: ICD-10-PCS | Mod: CPTII,S$GLB,, | Performed by: NURSE PRACTITIONER

## 2022-09-21 PROCEDURE — 1160F PR REVIEW ALL MEDS BY PRESCRIBER/CLIN PHARMACIST DOCUMENTED: ICD-10-PCS | Mod: CPTII,S$GLB,, | Performed by: NURSE PRACTITIONER

## 2022-09-21 PROCEDURE — 1160F RVW MEDS BY RX/DR IN RCRD: CPT | Mod: CPTII,S$GLB,, | Performed by: NURSE PRACTITIONER

## 2022-09-21 PROCEDURE — 90792 PR PSYCHIATRIC DIAGNOSTIC EVALUATION W/MEDICAL SERVICES: ICD-10-PCS | Mod: S$GLB,,, | Performed by: NURSE PRACTITIONER

## 2022-09-21 PROCEDURE — 3078F PR MOST RECENT DIASTOLIC BLOOD PRESSURE < 80 MM HG: ICD-10-PCS | Mod: CPTII,S$GLB,, | Performed by: NURSE PRACTITIONER

## 2022-09-21 PROCEDURE — 90792 PSYCH DIAG EVAL W/MED SRVCS: CPT | Mod: S$GLB,,, | Performed by: NURSE PRACTITIONER

## 2022-09-21 PROCEDURE — 3008F BODY MASS INDEX DOCD: CPT | Mod: CPTII,S$GLB,, | Performed by: NURSE PRACTITIONER

## 2022-09-21 PROCEDURE — 3078F DIAST BP <80 MM HG: CPT | Mod: CPTII,S$GLB,, | Performed by: NURSE PRACTITIONER

## 2022-09-21 PROCEDURE — 1159F PR MEDICATION LIST DOCUMENTED IN MEDICAL RECORD: ICD-10-PCS | Mod: CPTII,S$GLB,, | Performed by: NURSE PRACTITIONER

## 2022-09-21 PROCEDURE — 1159F MED LIST DOCD IN RCRD: CPT | Mod: CPTII,S$GLB,, | Performed by: NURSE PRACTITIONER

## 2022-09-21 PROCEDURE — 99999 PR PBB SHADOW E&M-EST. PATIENT-LVL III: ICD-10-PCS | Mod: PBBFAC,,, | Performed by: NURSE PRACTITIONER

## 2022-09-21 PROCEDURE — 3074F SYST BP LT 130 MM HG: CPT | Mod: CPTII,S$GLB,, | Performed by: NURSE PRACTITIONER

## 2022-09-21 PROCEDURE — 99999 PR PBB SHADOW E&M-EST. PATIENT-LVL III: CPT | Mod: PBBFAC,,, | Performed by: NURSE PRACTITIONER

## 2022-09-21 RX ORDER — VENLAFAXINE HYDROCHLORIDE 75 MG/1
75 CAPSULE, EXTENDED RELEASE ORAL DAILY
Qty: 30 CAPSULE | Refills: 3 | Status: SHIPPED | OUTPATIENT
Start: 2022-09-21 | End: 2022-11-07 | Stop reason: SDUPTHER

## 2022-09-21 RX ORDER — VENLAFAXINE HYDROCHLORIDE 150 MG/1
150 CAPSULE, EXTENDED RELEASE ORAL DAILY
Qty: 30 CAPSULE | Refills: 3 | Status: SHIPPED | OUTPATIENT
Start: 2022-09-21 | End: 2023-02-23 | Stop reason: DRUGHIGH

## 2022-09-21 RX ORDER — BUPROPION HYDROCHLORIDE 300 MG/1
300 TABLET ORAL DAILY
Qty: 30 TABLET | Refills: 3 | Status: SHIPPED | OUTPATIENT
Start: 2022-09-21 | End: 2023-02-23
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
## 2022-09-22 ENCOUNTER — LAB VISIT (OUTPATIENT)
Dept: LAB | Facility: HOSPITAL | Age: 57
End: 2022-09-22
Attending: INTERNAL MEDICINE
Payer: COMMERCIAL

## 2022-09-22 DIAGNOSIS — M79.10 MYALGIA: ICD-10-CM

## 2022-09-22 DIAGNOSIS — F33.2 SEVERE EPISODE OF RECURRENT MAJOR DEPRESSIVE DISORDER, WITHOUT PSYCHOTIC FEATURES: ICD-10-CM

## 2022-09-22 DIAGNOSIS — F41.1 GENERALIZED ANXIETY DISORDER: ICD-10-CM

## 2022-09-22 DIAGNOSIS — R53.83 FATIGUE, UNSPECIFIED TYPE: ICD-10-CM

## 2022-09-22 DIAGNOSIS — F40.10 SOCIAL ANXIETY DISORDER: ICD-10-CM

## 2022-09-22 LAB
ALBUMIN SERPL BCP-MCNC: 4.1 G/DL (ref 3.5–5.2)
ALP SERPL-CCNC: 146 U/L (ref 55–135)
ALT SERPL W/O P-5'-P-CCNC: 12 U/L (ref 10–44)
ANION GAP SERPL CALC-SCNC: 10 MMOL/L (ref 8–16)
AST SERPL-CCNC: 16 U/L (ref 10–40)
BASOPHILS # BLD AUTO: 0.03 K/UL (ref 0–0.2)
BASOPHILS NFR BLD: 0.8 % (ref 0–1.9)
BILIRUB SERPL-MCNC: 0.6 MG/DL (ref 0.1–1)
BUN SERPL-MCNC: 16 MG/DL (ref 6–20)
CALCIUM SERPL-MCNC: 9.4 MG/DL (ref 8.7–10.5)
CHLORIDE SERPL-SCNC: 105 MMOL/L (ref 95–110)
CO2 SERPL-SCNC: 27 MMOL/L (ref 23–29)
CREAT SERPL-MCNC: 1 MG/DL (ref 0.5–1.4)
DIFFERENTIAL METHOD: ABNORMAL
EOSINOPHIL # BLD AUTO: 0.1 K/UL (ref 0–0.5)
EOSINOPHIL NFR BLD: 3.3 % (ref 0–8)
ERYTHROCYTE [DISTWIDTH] IN BLOOD BY AUTOMATED COUNT: 15 % (ref 11.5–14.5)
EST. GFR  (NO RACE VARIABLE): >60 ML/MIN/1.73 M^2
FERRITIN SERPL-MCNC: 51 NG/ML (ref 20–300)
GLUCOSE SERPL-MCNC: 81 MG/DL (ref 70–110)
HCT VFR BLD AUTO: 40.4 % (ref 37–48.5)
HGB BLD-MCNC: 12.4 G/DL (ref 12–16)
IMM GRANULOCYTES # BLD AUTO: 0.01 K/UL (ref 0–0.04)
IMM GRANULOCYTES NFR BLD AUTO: 0.3 % (ref 0–0.5)
IRON SERPL-MCNC: 105 UG/DL (ref 30–160)
LYMPHOCYTES # BLD AUTO: 1.5 K/UL (ref 1–4.8)
LYMPHOCYTES NFR BLD: 36.7 % (ref 18–48)
MCH RBC QN AUTO: 27.3 PG (ref 27–31)
MCHC RBC AUTO-ENTMCNC: 30.7 G/DL (ref 32–36)
MCV RBC AUTO: 89 FL (ref 82–98)
MONOCYTES # BLD AUTO: 0.3 K/UL (ref 0.3–1)
MONOCYTES NFR BLD: 7 % (ref 4–15)
NEUTROPHILS # BLD AUTO: 2.1 K/UL (ref 1.8–7.7)
NEUTROPHILS NFR BLD: 51.9 % (ref 38–73)
NRBC BLD-RTO: 0 /100 WBC
PLATELET # BLD AUTO: 251 K/UL (ref 150–450)
PMV BLD AUTO: 10 FL (ref 9.2–12.9)
POTASSIUM SERPL-SCNC: 3.3 MMOL/L (ref 3.5–5.1)
PROT SERPL-MCNC: 7.4 G/DL (ref 6–8.4)
RBC # BLD AUTO: 4.55 M/UL (ref 4–5.4)
SATURATED IRON: 34 % (ref 20–50)
SODIUM SERPL-SCNC: 142 MMOL/L (ref 136–145)
TOTAL IRON BINDING CAPACITY: 309 UG/DL (ref 250–450)
TRANSFERRIN SERPL-MCNC: 209 MG/DL (ref 200–375)
TSH SERPL DL<=0.005 MIU/L-ACNC: 1.06 UIU/ML (ref 0.4–4)
WBC # BLD AUTO: 3.98 K/UL (ref 3.9–12.7)

## 2022-09-22 PROCEDURE — 82728 ASSAY OF FERRITIN: CPT | Performed by: NURSE PRACTITIONER

## 2022-09-22 PROCEDURE — 84443 ASSAY THYROID STIM HORMONE: CPT | Performed by: NURSE PRACTITIONER

## 2022-09-22 PROCEDURE — 82652 VIT D 1 25-DIHYDROXY: CPT | Performed by: NURSE PRACTITIONER

## 2022-09-22 PROCEDURE — 84466 ASSAY OF TRANSFERRIN: CPT | Performed by: NURSE PRACTITIONER

## 2022-09-22 PROCEDURE — 36415 COLL VENOUS BLD VENIPUNCTURE: CPT | Mod: PN | Performed by: NURSE PRACTITIONER

## 2022-09-22 PROCEDURE — 85025 COMPLETE CBC W/AUTO DIFF WBC: CPT | Performed by: NURSE PRACTITIONER

## 2022-09-22 PROCEDURE — 80053 COMPREHEN METABOLIC PANEL: CPT | Performed by: NURSE PRACTITIONER

## 2022-09-26 LAB — 1,25(OH)2D3 SERPL-MCNC: 79 PG/ML (ref 20–79)

## 2022-09-27 ENCOUNTER — TELEPHONE (OUTPATIENT)
Dept: INTERNAL MEDICINE | Facility: CLINIC | Age: 57
End: 2022-09-27
Payer: COMMERCIAL

## 2022-09-27 NOTE — TELEPHONE ENCOUNTER
----- Message from Diane Shafer sent at 9/26/2022  1:30 PM CDT -----  Contact: JACQUES, PHOENESHIA L [[id number]] [phone]  Type: Appointment Request    Name of Caller: JACQUES, PHOENESHIA L [[id number]]  When is the first available appointment? Do not have access  Reason for Visit:  Re-establish care and address pain in leg whenever lifting leg to walk  Best Call Back Number: [phone]  Additional Information:  Patient last saw Dr. Bowen in 2004 and wishes to reestablish care with her as PCP.

## 2022-09-29 ENCOUNTER — OFFICE VISIT (OUTPATIENT)
Dept: OBSTETRICS AND GYNECOLOGY | Facility: CLINIC | Age: 57
End: 2022-09-29
Payer: COMMERCIAL

## 2022-09-29 VITALS
WEIGHT: 185.19 LBS | HEIGHT: 64 IN | BODY MASS INDEX: 31.62 KG/M2 | DIASTOLIC BLOOD PRESSURE: 78 MMHG | SYSTOLIC BLOOD PRESSURE: 122 MMHG

## 2022-09-29 DIAGNOSIS — I10 ESSENTIAL HYPERTENSION: ICD-10-CM

## 2022-09-29 DIAGNOSIS — Z01.419 ENCOUNTER FOR GYNECOLOGICAL EXAMINATION WITHOUT ABNORMAL FINDING: Primary | ICD-10-CM

## 2022-09-29 DIAGNOSIS — N89.8 VAGINAL DISCHARGE: ICD-10-CM

## 2022-09-29 DIAGNOSIS — E78.2 MIXED HYPERLIPIDEMIA: ICD-10-CM

## 2022-09-29 DIAGNOSIS — Z12.31 VISIT FOR SCREENING MAMMOGRAM: ICD-10-CM

## 2022-09-29 DIAGNOSIS — F41.8 DEPRESSION WITH ANXIETY: ICD-10-CM

## 2022-09-29 PROCEDURE — 99396 PREV VISIT EST AGE 40-64: CPT | Mod: S$GLB,,, | Performed by: OBSTETRICS & GYNECOLOGY

## 2022-09-29 PROCEDURE — 3008F BODY MASS INDEX DOCD: CPT | Mod: CPTII,S$GLB,, | Performed by: OBSTETRICS & GYNECOLOGY

## 2022-09-29 PROCEDURE — 3078F PR MOST RECENT DIASTOLIC BLOOD PRESSURE < 80 MM HG: ICD-10-PCS | Mod: CPTII,S$GLB,, | Performed by: OBSTETRICS & GYNECOLOGY

## 2022-09-29 PROCEDURE — 1159F PR MEDICATION LIST DOCUMENTED IN MEDICAL RECORD: ICD-10-PCS | Mod: CPTII,S$GLB,, | Performed by: OBSTETRICS & GYNECOLOGY

## 2022-09-29 PROCEDURE — 3078F DIAST BP <80 MM HG: CPT | Mod: CPTII,S$GLB,, | Performed by: OBSTETRICS & GYNECOLOGY

## 2022-09-29 PROCEDURE — 3074F SYST BP LT 130 MM HG: CPT | Mod: CPTII,S$GLB,, | Performed by: OBSTETRICS & GYNECOLOGY

## 2022-09-29 PROCEDURE — 99999 PR PBB SHADOW E&M-EST. PATIENT-LVL III: ICD-10-PCS | Mod: PBBFAC,,, | Performed by: OBSTETRICS & GYNECOLOGY

## 2022-09-29 PROCEDURE — 3074F PR MOST RECENT SYSTOLIC BLOOD PRESSURE < 130 MM HG: ICD-10-PCS | Mod: CPTII,S$GLB,, | Performed by: OBSTETRICS & GYNECOLOGY

## 2022-09-29 PROCEDURE — 1160F RVW MEDS BY RX/DR IN RCRD: CPT | Mod: CPTII,S$GLB,, | Performed by: OBSTETRICS & GYNECOLOGY

## 2022-09-29 PROCEDURE — 99999 PR PBB SHADOW E&M-EST. PATIENT-LVL III: CPT | Mod: PBBFAC,,, | Performed by: OBSTETRICS & GYNECOLOGY

## 2022-09-29 PROCEDURE — 3008F PR BODY MASS INDEX (BMI) DOCUMENTED: ICD-10-PCS | Mod: CPTII,S$GLB,, | Performed by: OBSTETRICS & GYNECOLOGY

## 2022-09-29 PROCEDURE — 81514 NFCT DS BV&VAGINITIS DNA ALG: CPT | Performed by: OBSTETRICS & GYNECOLOGY

## 2022-09-29 PROCEDURE — 1159F MED LIST DOCD IN RCRD: CPT | Mod: CPTII,S$GLB,, | Performed by: OBSTETRICS & GYNECOLOGY

## 2022-09-29 PROCEDURE — 99396 PR PREVENTIVE VISIT,EST,40-64: ICD-10-PCS | Mod: S$GLB,,, | Performed by: OBSTETRICS & GYNECOLOGY

## 2022-09-29 PROCEDURE — 1160F PR REVIEW ALL MEDS BY PRESCRIBER/CLIN PHARMACIST DOCUMENTED: ICD-10-PCS | Mod: CPTII,S$GLB,, | Performed by: OBSTETRICS & GYNECOLOGY

## 2022-09-30 LAB
BACTERIAL VAGINOSIS DNA: NEGATIVE
CANDIDA GLABRATA DNA: NEGATIVE
CANDIDA KRUSEI DNA: NEGATIVE
CANDIDA RRNA VAG QL PROBE: NEGATIVE
T VAGINALIS RRNA GENITAL QL PROBE: NEGATIVE

## 2022-10-02 NOTE — PROGRESS NOTES
HISTORY OF PRESENT ILLNESS:    Phoeneshia L Jacques is a 56 y.o. female, , No LMP recorded. Patient is perimenopausal.,  presents for a routine exam and has no complaints.    Past Medical History:   Diagnosis Date    Anxiety     Arthritis     Asthma     Depression     Hx of psychiatric care     Hypertension     Psychiatric problem     Sleep difficulties     Therapy        Past Surgical History:   Procedure Laterality Date    BREAST BIOPSY      essure      TOTAL HIP ARTHROPLASTY Left 2019    TUBAL LIGATION         MEDICATIONS AND ALLERGIES:      Current Outpatient Medications:     acetaminophen (TYLENOL) 500 MG tablet, Take by mouth., Disp: , Rfl:     buPROPion (WELLBUTRIN XL) 300 MG 24 hr tablet, Take 1 tablet (300 mg total) by mouth once daily., Disp: 30 tablet, Rfl: 3    hydroCHLOROthiazide (HYDRODIURIL) 12.5 MG Tab, Take 1 tablet by mouth once daily., Disp: , Rfl:     venlafaxine (EFFEXOR-XR) 150 MG Cp24, Take 1 capsule (150 mg total) by mouth once daily. To take with Effexor XR 75 mg for a total of 225 mg daily., Disp: 30 capsule, Rfl: 3    venlafaxine (EFFEXOR-XR) 75 MG 24 hr capsule, Take 1 capsule (75 mg total) by mouth once daily. To take with Effexor  mg for a total of 225 mg daily., Disp: 30 capsule, Rfl: 3    Review of patient's allergies indicates:  No Known Allergies    Family History   Problem Relation Age of Onset    Depression Mother     Bipolar disorder Mother     Schizophrenia Mother     Heart disease Mother     Hypertension Mother     Cancer Paternal Aunt     Bipolar disorder Brother     Schizophrenia Brother     Learning disabilities Paternal Uncle     Learning disabilities Son        Social History     Socioeconomic History    Marital status:      Spouse name: Guillermo    Number of children: 4   Occupational History     Employer:  NOT EMPLOYED   Tobacco Use    Smoking status: Never    Smokeless tobacco: Never   Substance and Sexual Activity    Alcohol use: No      "Alcohol/week: 0.0 standard drinks    Drug use: No    Sexual activity: Yes     Partners: Male       COMPREHENSIVE GYN HISTORY:  PAP History: Denies abnormal Paps.  Infection History: Denies STDs. Denies PID.  Benign History: Denies uterine fibroids. Denies ovarian cysts. Denies endometriosis. Denies other conditions.  Cancer History: Denies cervical cancer. Denies uterine cancer or hyperplasia. Denies ovarian cancer. Denies vulvar cancer or pre-cancer. Denies vaginal cancer or pre-cancer. Denies breast cancer. Denies colon cancer.  Sexual Activity History: Reports currently being sexually active  Menstrual History: Monthly. Mod then light flow.   Dysmenorrhea History: Reports mild dysmenorrhea.       ROS:  GENERAL: No weight changes. No swelling. No fatigue. No fever.  CARDIOVASCULAR: No chest pain. No shortness of breath. No leg cramps.   NEUROLOGICAL: No headaches. No vision changes.  BREASTS: No pain. No lumps. No discharge.  ABDOMEN: No pain. No nausea. No vomiting. No diarrhea. No constipation.  REPRODUCTIVE: No abnormal bleeding.   VULVA: No pain. No lesions. No itching.  VAGINA: No relaxation. No itching. No odor. No discharge. No lesions.  URINARY: No incontinence. No nocturia. No frequency. No dysuria.    /78   Ht 5' 4" (1.626 m)   Wt 84 kg (185 lb 3 oz)   BMI 31.79 kg/m²     PE:  APPEARANCE: Well nourished, well developed, in no acute distress.  AFFECT: WNL, alert and oriented x 3.  SKIN: No acne or hirsutism.  NECK: Neck symmetric, without masses or thyromegaly.  NODES: No inguinal, cervical, axillary or femoral lymph node enlargement.  CHEST: Good respiratory effort.   ABDOMEN: Soft. No tenderness or masses. No hepatosplenomegaly. No hernias.  BREASTS: Symmetrical, no skin changes, visible lesions, palpable masses or nipple discharge bilaterally.  PELVIC: External female genitalia without lesions.  Female hair distribution. Adequate perineal body, Normal urethral meatus. Vagina moist and well " rugated without lesions or discharge.  No significant cystocele or rectocele present. Cervix pink without lesions, discharge or tenderness. Uterus is 4-6 week size, regular, mobile and nontender. Adnexa without masses or tenderness.  EXTREMITIES: No edema    DIAGNOSIS:  1. Encounter for gynecological examination without abnormal finding    2. Visit for screening mammogram    3. Vaginal discharge    4. Essential hypertension    5. Mixed hyperlipidemia    6. Depression with anxiety        PLAN:    Orders Placed This Encounter    Vaginosis Screen by DNA Probe    Mammo Digital Screening Bilat w/ Michele       COUNSELING:  The patient was counseled today on:  -A.C.S. Pap and pelvic exam guidelines (pap every 3 years), recomendations for yearly mammogram;  -to follow up with her PCP for other health maintenance.    FOLLOW-UP with me annually.

## 2022-10-04 ENCOUNTER — TELEPHONE (OUTPATIENT)
Dept: OBSTETRICS AND GYNECOLOGY | Facility: CLINIC | Age: 57
End: 2022-10-04
Payer: COMMERCIAL

## 2022-10-27 ENCOUNTER — OFFICE VISIT (OUTPATIENT)
Dept: OBSTETRICS AND GYNECOLOGY | Facility: CLINIC | Age: 57
End: 2022-10-27
Payer: COMMERCIAL

## 2022-10-27 VITALS — WEIGHT: 188.19 LBS | BODY MASS INDEX: 32.3 KG/M2 | SYSTOLIC BLOOD PRESSURE: 126 MMHG | DIASTOLIC BLOOD PRESSURE: 88 MMHG

## 2022-10-27 DIAGNOSIS — Z01.419 ENCOUNTER FOR GYNECOLOGICAL EXAMINATION WITHOUT ABNORMAL FINDING: Primary | ICD-10-CM

## 2022-10-27 DIAGNOSIS — E78.2 MIXED HYPERLIPIDEMIA: ICD-10-CM

## 2022-10-27 DIAGNOSIS — I10 ESSENTIAL HYPERTENSION: ICD-10-CM

## 2022-10-27 DIAGNOSIS — F41.8 DEPRESSION WITH ANXIETY: ICD-10-CM

## 2022-10-27 PROCEDURE — 1159F MED LIST DOCD IN RCRD: CPT | Mod: CPTII,S$GLB,, | Performed by: OBSTETRICS & GYNECOLOGY

## 2022-10-27 PROCEDURE — 99999 PR PBB SHADOW E&M-EST. PATIENT-LVL III: ICD-10-PCS | Mod: PBBFAC,,, | Performed by: OBSTETRICS & GYNECOLOGY

## 2022-10-27 PROCEDURE — 3074F SYST BP LT 130 MM HG: CPT | Mod: CPTII,S$GLB,, | Performed by: OBSTETRICS & GYNECOLOGY

## 2022-10-27 PROCEDURE — 1159F PR MEDICATION LIST DOCUMENTED IN MEDICAL RECORD: ICD-10-PCS | Mod: CPTII,S$GLB,, | Performed by: OBSTETRICS & GYNECOLOGY

## 2022-10-27 PROCEDURE — 99396 PR PREVENTIVE VISIT,EST,40-64: ICD-10-PCS | Mod: S$GLB,,, | Performed by: OBSTETRICS & GYNECOLOGY

## 2022-10-27 PROCEDURE — 99396 PREV VISIT EST AGE 40-64: CPT | Mod: S$GLB,,, | Performed by: OBSTETRICS & GYNECOLOGY

## 2022-10-27 PROCEDURE — 3079F DIAST BP 80-89 MM HG: CPT | Mod: CPTII,S$GLB,, | Performed by: OBSTETRICS & GYNECOLOGY

## 2022-10-27 PROCEDURE — 99999 PR PBB SHADOW E&M-EST. PATIENT-LVL III: CPT | Mod: PBBFAC,,, | Performed by: OBSTETRICS & GYNECOLOGY

## 2022-10-27 PROCEDURE — 3074F PR MOST RECENT SYSTOLIC BLOOD PRESSURE < 130 MM HG: ICD-10-PCS | Mod: CPTII,S$GLB,, | Performed by: OBSTETRICS & GYNECOLOGY

## 2022-10-27 PROCEDURE — 1160F RVW MEDS BY RX/DR IN RCRD: CPT | Mod: CPTII,S$GLB,, | Performed by: OBSTETRICS & GYNECOLOGY

## 2022-10-27 PROCEDURE — 1160F PR REVIEW ALL MEDS BY PRESCRIBER/CLIN PHARMACIST DOCUMENTED: ICD-10-PCS | Mod: CPTII,S$GLB,, | Performed by: OBSTETRICS & GYNECOLOGY

## 2022-10-27 PROCEDURE — 3079F PR MOST RECENT DIASTOLIC BLOOD PRESSURE 80-89 MM HG: ICD-10-PCS | Mod: CPTII,S$GLB,, | Performed by: OBSTETRICS & GYNECOLOGY

## 2022-10-30 NOTE — PROGRESS NOTES
HISTORY OF PRESENT ILLNESS:    Phoeneshia L Jacques is a 56 y.o. female, , No LMP recorded. Patient is perimenopausal.,  presents for a routine exam and has no complaints.      Past Medical History:   Diagnosis Date    Anxiety     Arthritis     Asthma     Depression      of psychiatric care     Hypertension     Psychiatric problem     Sleep difficulties     Therapy        Past Surgical History:   Procedure Laterality Date    BREAST BIOPSY      essure      TOTAL HIP ARTHROPLASTY Left 2019    TUBAL LIGATION         MEDICATIONS AND ALLERGIES:      Current Outpatient Medications:     acetaminophen (TYLENOL) 500 MG tablet, Take by mouth., Disp: , Rfl:     buPROPion (WELLBUTRIN XL) 300 MG 24 hr tablet, Take 1 tablet (300 mg total) by mouth once daily., Disp: 30 tablet, Rfl: 3    hydroCHLOROthiazide (HYDRODIURIL) 12.5 MG Tab, Take 1 tablet by mouth once daily., Disp: , Rfl:     venlafaxine (EFFEXOR-XR) 150 MG Cp24, Take 1 capsule (150 mg total) by mouth once daily. To take with Effexor XR 75 mg for a total of 225 mg daily., Disp: 30 capsule, Rfl: 3    venlafaxine (EFFEXOR-XR) 75 MG 24 hr capsule, Take 1 capsule (75 mg total) by mouth once daily. To take with Effexor  mg for a total of 225 mg daily., Disp: 30 capsule, Rfl: 3    Review of patient's allergies indicates:  No Known Allergies    Family History   Problem Relation Age of Onset    Depression Mother     Bipolar disorder Mother     Schizophrenia Mother     Heart disease Mother     Hypertension Mother     Cancer Paternal Aunt     Bipolar disorder Brother     Schizophrenia Brother     Learning disabilities Paternal Uncle     Learning disabilities Son        Social History     Socioeconomic History    Marital status:      Spouse name: Guillermo    Number of children: 4   Occupational History     Employer:  NOT EMPLOYED   Tobacco Use    Smoking status: Never    Smokeless tobacco: Never   Substance and Sexual Activity    Alcohol use: No      Alcohol/week: 0.0 standard drinks    Drug use: No    Sexual activity: Yes     Partners: Male       COMPREHENSIVE GYN HISTORY:  PAP History: Denies abnormal Paps.  Infection History: Denies STDs. Denies PID.  Benign History: Denies uterine fibroids. Denies ovarian cysts. Denies endometriosis. Denies other conditions.  Cancer History: Denies cervical cancer. Denies uterine cancer or hyperplasia. Denies ovarian cancer. Denies vulvar cancer or pre-cancer. Denies vaginal cancer or pre-cancer. Denies breast cancer. Denies colon cancer.  Sexual Activity History: Reports currently being sexually active  Menstrual History: Monthly. Mod then light flow.   Dysmenorrhea History: Reports mild dysmenorrhea.       ROS:  GENERAL: No weight changes. No swelling. No fatigue. No fever.  CARDIOVASCULAR: No chest pain. No shortness of breath. No leg cramps.   NEUROLOGICAL: No headaches. No vision changes.  BREASTS: No pain. No lumps. No discharge.  ABDOMEN: No pain. No nausea. No vomiting. No diarrhea. No constipation.  REPRODUCTIVE: No abnormal bleeding.   VULVA: No pain. No lesions. No itching.  VAGINA: No relaxation. No itching. No odor. No discharge. No lesions.  URINARY: No incontinence. No nocturia. No frequency. No dysuria.    /88 (BP Location: Right arm, Patient Position: Sitting, BP Method: Medium (Manual))   Wt 85.3 kg (188 lb 2.6 oz)   BMI 32.30 kg/m²     PE:  APPEARANCE: Well nourished, well developed, in no acute distress.  AFFECT: WNL, alert and oriented x 3.  SKIN: No acne or hirsutism.  NECK: Neck symmetric, without masses or thyromegaly.  NODES: No inguinal, cervical, axillary or femoral lymph node enlargement.  CHEST: Good respiratory effort.   ABDOMEN: Soft. No tenderness or masses. No hepatosplenomegaly. No hernias.  BREASTS: Symmetrical, no skin changes, visible lesions, palpable masses or nipple discharge bilaterally.  PELVIC: External female genitalia without lesions.  Female hair distribution. Adequate  perineal body, Normal urethral meatus. Vagina moist and well rugated without lesions or discharge.  No significant cystocele or rectocele present. Cervix pink without lesions, discharge or tenderness. Uterus is 4-6 week size, regular, mobile and nontender. Adnexa without masses or tenderness.  EXTREMITIES: No edema    DIAGNOSIS:  1. Encounter for gynecological examination without abnormal finding    2. Essential hypertension    3. Mixed hyperlipidemia    4. Depression with anxiety        PLAN:         COUNSELING:  The patient was counseled today on:  -A.C.S. Pap and pelvic exam guidelines (pap every 3 years), recomendations for yearly mammogram;  -to follow up with her PCP for other health maintenance.    FOLLOW-UP with me annually.

## 2022-11-01 ENCOUNTER — PROCEDURE VISIT (OUTPATIENT)
Dept: OBSTETRICS AND GYNECOLOGY | Facility: CLINIC | Age: 57
End: 2022-11-01
Payer: COMMERCIAL

## 2022-11-01 VITALS — WEIGHT: 185.19 LBS | BODY MASS INDEX: 31.62 KG/M2 | HEIGHT: 64 IN

## 2022-11-01 DIAGNOSIS — N84.1 POLYP OF CERVIX: Primary | ICD-10-CM

## 2022-11-01 LAB
B-HCG UR QL: NEGATIVE
CTP QC/QA: YES

## 2022-11-01 PROCEDURE — 57500 BIOPSY (GYNECOLOGICAL): ICD-10-PCS | Mod: S$GLB,,, | Performed by: OBSTETRICS & GYNECOLOGY

## 2022-11-01 PROCEDURE — 88305 TISSUE EXAM BY PATHOLOGIST: CPT | Mod: 26,,, | Performed by: PATHOLOGY

## 2022-11-01 PROCEDURE — 81025 URINE PREGNANCY TEST: CPT | Mod: S$GLB,,, | Performed by: OBSTETRICS & GYNECOLOGY

## 2022-11-01 PROCEDURE — 57500 BIOPSY OF CERVIX: CPT | Mod: S$GLB,,, | Performed by: OBSTETRICS & GYNECOLOGY

## 2022-11-01 PROCEDURE — 88305 TISSUE EXAM BY PATHOLOGIST: CPT | Mod: 59 | Performed by: PATHOLOGY

## 2022-11-01 PROCEDURE — 88305 TISSUE EXAM BY PATHOLOGIST: ICD-10-PCS | Mod: 26,,, | Performed by: PATHOLOGY

## 2022-11-01 PROCEDURE — 81025 POCT URINE PREGNANCY: ICD-10-PCS | Mod: S$GLB,,, | Performed by: OBSTETRICS & GYNECOLOGY

## 2022-11-01 NOTE — PROCEDURES
Biopsy (Gynecological)    Date/Time: 11/1/2022 1:00 PM  Performed by: Carolyne Flower MD  Authorized by: Carolyne Flower MD     Consent Done?:  Yes (Written)  Local anesthesia used?: No      Biopsy Location:  Cervix  Cervix:     : Polyp.   Patient tolerated the procedure well with no immediate complications.     [facility]

## 2022-11-04 LAB
FINAL PATHOLOGIC DIAGNOSIS: NORMAL
GROSS: NORMAL
Lab: NORMAL

## 2022-11-06 ENCOUNTER — PATIENT MESSAGE (OUTPATIENT)
Dept: OBSTETRICS AND GYNECOLOGY | Facility: CLINIC | Age: 57
End: 2022-11-06
Payer: COMMERCIAL

## 2022-11-07 ENCOUNTER — OFFICE VISIT (OUTPATIENT)
Dept: INTERNAL MEDICINE | Facility: CLINIC | Age: 57
End: 2022-11-07
Payer: COMMERCIAL

## 2022-11-07 VITALS
WEIGHT: 186.63 LBS | TEMPERATURE: 98 F | RESPIRATION RATE: 16 BRPM | BODY MASS INDEX: 31.86 KG/M2 | OXYGEN SATURATION: 99 % | SYSTOLIC BLOOD PRESSURE: 100 MMHG | HEIGHT: 64 IN | DIASTOLIC BLOOD PRESSURE: 64 MMHG

## 2022-11-07 DIAGNOSIS — Z00.00 ROUTINE MEDICAL EXAM: Primary | ICD-10-CM

## 2022-11-07 DIAGNOSIS — M79.651 RIGHT THIGH PAIN: Primary | ICD-10-CM

## 2022-11-07 PROCEDURE — 99386 PREV VISIT NEW AGE 40-64: CPT | Mod: 25,S$GLB,, | Performed by: INTERNAL MEDICINE

## 2022-11-07 PROCEDURE — 90686 IIV4 VACC NO PRSV 0.5 ML IM: CPT | Mod: S$GLB,,, | Performed by: INTERNAL MEDICINE

## 2022-11-07 PROCEDURE — 99999 PR PBB SHADOW E&M-EST. PATIENT-LVL IV: ICD-10-PCS | Mod: PBBFAC,,, | Performed by: INTERNAL MEDICINE

## 2022-11-07 PROCEDURE — 3044F PR MOST RECENT HEMOGLOBIN A1C LEVEL <7.0%: ICD-10-PCS | Mod: CPTII,S$GLB,, | Performed by: INTERNAL MEDICINE

## 2022-11-07 PROCEDURE — 3074F PR MOST RECENT SYSTOLIC BLOOD PRESSURE < 130 MM HG: ICD-10-PCS | Mod: CPTII,S$GLB,, | Performed by: INTERNAL MEDICINE

## 2022-11-07 PROCEDURE — 3008F BODY MASS INDEX DOCD: CPT | Mod: CPTII,S$GLB,, | Performed by: INTERNAL MEDICINE

## 2022-11-07 PROCEDURE — 90686 FLU VACCINE (QUAD) GREATER THAN OR EQUAL TO 3YO PRESERVATIVE FREE IM: ICD-10-PCS | Mod: S$GLB,,, | Performed by: INTERNAL MEDICINE

## 2022-11-07 PROCEDURE — 3008F PR BODY MASS INDEX (BMI) DOCUMENTED: ICD-10-PCS | Mod: CPTII,S$GLB,, | Performed by: INTERNAL MEDICINE

## 2022-11-07 PROCEDURE — 3044F HG A1C LEVEL LT 7.0%: CPT | Mod: CPTII,S$GLB,, | Performed by: INTERNAL MEDICINE

## 2022-11-07 PROCEDURE — 3078F DIAST BP <80 MM HG: CPT | Mod: CPTII,S$GLB,, | Performed by: INTERNAL MEDICINE

## 2022-11-07 PROCEDURE — 3078F PR MOST RECENT DIASTOLIC BLOOD PRESSURE < 80 MM HG: ICD-10-PCS | Mod: CPTII,S$GLB,, | Performed by: INTERNAL MEDICINE

## 2022-11-07 PROCEDURE — 3074F SYST BP LT 130 MM HG: CPT | Mod: CPTII,S$GLB,, | Performed by: INTERNAL MEDICINE

## 2022-11-07 PROCEDURE — 99386 PR PREVENTIVE VISIT,NEW,40-64: ICD-10-PCS | Mod: 25,S$GLB,, | Performed by: INTERNAL MEDICINE

## 2022-11-07 PROCEDURE — 99999 PR PBB SHADOW E&M-EST. PATIENT-LVL IV: CPT | Mod: PBBFAC,,, | Performed by: INTERNAL MEDICINE

## 2022-11-07 PROCEDURE — 90471 IMMUNIZATION ADMIN: CPT | Mod: S$GLB,,, | Performed by: INTERNAL MEDICINE

## 2022-11-07 PROCEDURE — 90471 FLU VACCINE (QUAD) GREATER THAN OR EQUAL TO 3YO PRESERVATIVE FREE IM: ICD-10-PCS | Mod: S$GLB,,, | Performed by: INTERNAL MEDICINE

## 2022-11-07 NOTE — PROGRESS NOTES
The patient is a 56 y.o. old female who presents to the office for a physical.    PAST MEDICAL HISTORY  Past Medical History:   Diagnosis Date    Anxiety     Arthritis     Asthma     Depression     Hx of psychiatric care     Hypertension     Psychiatric problem     Sleep difficulties     Therapy        SURGICAL HISTORY:  Past Surgical History:   Procedure Laterality Date    BREAST BIOPSY      essure      TOTAL HIP ARTHROPLASTY Left 01/23/2019    TUBAL LIGATION           MEDS:  Medcard reviewed and updated    ALLERGIES: Allergy Card reviewed and updated    SOCIAL HISTORY:   The patient is a nonsmoker, denies alcohol or illicit drug use.    ROS:  GENERAL: No fever, chills or weight loss.  Positive fatigue.  SKIN: No rashes.  HEAD: Positive headaches, occur almost daily, does not awaken her from sleep.  Pain is a pressure sensation, relieved with tylenol.  Denies recent head trauma.  EYES: No photophobia, ocular pain or diplopia.  EARS: Denies ear pain, discharge or vertigo.  NOSE: No epistaxis or postnasal drip.  MOUTH & THROAT: No hoarseness or change in voice.   NODES: Denies swollen glands.  CHEST: Denies shortness of breath or wheezing.  Positive cough with sputum production.  CARDIOVASCULAR: Denies chest pain or palpitations.  ABDOMEN: Appetite fine. Denies diarrhea, abdominal pain, constipation or blood in stool.  Positive reflux recently.  URINARY: No dysuria or hematuria.  MUSCULOSKELETAL: Positive right thigh pain, cramping sensation, unable to climb stairs. Denies back pain.  NEUROLOGIC: No history of seizures.  Positive dizziness.  ENDOCRINE: Denies polyuria.  Positive polydipsia.  PSYCHIATRIC: Denies mood swings, homicidal or suicidal thoughts.  Positive depression and anxiety.    SCREENINGS:  Last cholesterol:2021 .  Last colonoscopy: about 4 years ago  Last mammogram: 2022  Last Pap smear: 2021  Last tetanus: unknown  Last Pneumovax: none  Last eye exam: 1-2 years ago  Last bone density: none  Last  menstrual period: postmenopausal    PE:   Vitals:  Vitals:    11/07/22 1102   BP: 100/64   Resp: 16   Temp: 97.7 °F (36.5 °C)       APPEARANCE: Well nourished, well developed, in no acute distress.    EYES: Sclerae anicteric. PERRL. EOMI.      EARS: TM's intact. No retraction or perforation.    NOSE: Mucosa pink. Airway clear.  MOUTH & THROAT: No tonsillar enlargement. No pharyngeal erythema or exudate. No stridor.  NECK: Supple, no thyromegaly.  NODES: No cervical, axillary or inguinal lymph node enlargement.  CHEST: Lungs clear to auscultation with unlabored respirations.  CARDIOVASCULAR: Normal S1, S2. No murmurs. No carotid bruits. No pedal edema.  ABDOMEN: Bowel sounds normal. Not distended. Soft. No tenderness or masses. No organomegaly.  MUSCULOSKELETAL:  Normal gait, no cyanosis or clubbing.   SKIN: Normal skin turgor, warm and dry.  NEUROLOGIC: Cranial Nerves: Intact.  PSYCHIATRIC: The patient is oriented to person, place, and time and has a pleasant affect.        ASSESSMENT/PLAN:  Phoenescarlos manuel was seen today for leg pain.    Diagnoses and all orders for this visit:    Routine medical exam  -     CBC Auto Differential; Future  -     Comprehensive Metabolic Panel; Future  -     Lipid Panel; Future  -     TSH; Future  -     Hemoglobin A1C; Future  -     Urinalysis; Future    Other orders  -     Influenza - Quadrivalent (PF)

## 2022-11-08 ENCOUNTER — CLINICAL SUPPORT (OUTPATIENT)
Dept: PRIMARY CARE CLINIC | Facility: CLINIC | Age: 57
End: 2022-11-08
Payer: COMMERCIAL

## 2022-11-08 ENCOUNTER — PATIENT MESSAGE (OUTPATIENT)
Dept: ADMINISTRATIVE | Facility: HOSPITAL | Age: 57
End: 2022-11-08
Payer: COMMERCIAL

## 2022-11-08 ENCOUNTER — LAB VISIT (OUTPATIENT)
Dept: PRIMARY CARE CLINIC | Facility: CLINIC | Age: 57
End: 2022-11-08
Payer: COMMERCIAL

## 2022-11-08 DIAGNOSIS — Z00.00 ROUTINE MEDICAL EXAM: ICD-10-CM

## 2022-11-08 LAB
ALBUMIN SERPL BCP-MCNC: 4.2 G/DL (ref 3.5–5.2)
ALP SERPL-CCNC: 136 U/L (ref 55–135)
ALT SERPL W/O P-5'-P-CCNC: 13 U/L (ref 10–44)
ANION GAP SERPL CALC-SCNC: 10 MMOL/L (ref 8–16)
AST SERPL-CCNC: 17 U/L (ref 10–40)
BACTERIA #/AREA URNS AUTO: NORMAL /HPF
BASOPHILS # BLD AUTO: 0.03 K/UL (ref 0–0.2)
BASOPHILS NFR BLD: 0.7 % (ref 0–1.9)
BILIRUB SERPL-MCNC: 0.6 MG/DL (ref 0.1–1)
BILIRUB UR QL STRIP: NEGATIVE
BUN SERPL-MCNC: 12 MG/DL (ref 6–20)
CALCIUM SERPL-MCNC: 9.9 MG/DL (ref 8.7–10.5)
CHLORIDE SERPL-SCNC: 105 MMOL/L (ref 95–110)
CHOLEST SERPL-MCNC: 237 MG/DL (ref 120–199)
CHOLEST/HDLC SERPL: 4 {RATIO} (ref 2–5)
CLARITY UR REFRACT.AUTO: CLEAR
CO2 SERPL-SCNC: 29 MMOL/L (ref 23–29)
COLOR UR AUTO: YELLOW
CREAT SERPL-MCNC: 1.1 MG/DL (ref 0.5–1.4)
DIFFERENTIAL METHOD: ABNORMAL
EOSINOPHIL # BLD AUTO: 0.2 K/UL (ref 0–0.5)
EOSINOPHIL NFR BLD: 3.8 % (ref 0–8)
ERYTHROCYTE [DISTWIDTH] IN BLOOD BY AUTOMATED COUNT: 15.1 % (ref 11.5–14.5)
EST. GFR  (NO RACE VARIABLE): 59.7 ML/MIN/1.73 M^2
ESTIMATED AVG GLUCOSE: 105 MG/DL (ref 68–131)
GLUCOSE SERPL-MCNC: 88 MG/DL (ref 70–110)
GLUCOSE UR QL STRIP: NEGATIVE
HBA1C MFR BLD: 5.3 % (ref 4–5.6)
HCT VFR BLD AUTO: 41.2 % (ref 37–48.5)
HDLC SERPL-MCNC: 59 MG/DL (ref 40–75)
HDLC SERPL: 24.9 % (ref 20–50)
HGB BLD-MCNC: 12.7 G/DL (ref 12–16)
HGB UR QL STRIP: NEGATIVE
IMM GRANULOCYTES # BLD AUTO: 0.01 K/UL (ref 0–0.04)
IMM GRANULOCYTES NFR BLD AUTO: 0.2 % (ref 0–0.5)
KETONES UR QL STRIP: NEGATIVE
LDLC SERPL CALC-MCNC: 160 MG/DL (ref 63–159)
LEUKOCYTE ESTERASE UR QL STRIP: ABNORMAL
LYMPHOCYTES # BLD AUTO: 1.4 K/UL (ref 1–4.8)
LYMPHOCYTES NFR BLD: 32 % (ref 18–48)
MCH RBC QN AUTO: 27.4 PG (ref 27–31)
MCHC RBC AUTO-ENTMCNC: 30.8 G/DL (ref 32–36)
MCV RBC AUTO: 89 FL (ref 82–98)
MICROSCOPIC COMMENT: NORMAL
MONOCYTES # BLD AUTO: 0.3 K/UL (ref 0.3–1)
MONOCYTES NFR BLD: 7.6 % (ref 4–15)
NEUTROPHILS # BLD AUTO: 2.5 K/UL (ref 1.8–7.7)
NEUTROPHILS NFR BLD: 55.7 % (ref 38–73)
NITRITE UR QL STRIP: NEGATIVE
NONHDLC SERPL-MCNC: 178 MG/DL
NRBC BLD-RTO: 0 /100 WBC
PH UR STRIP: 8 [PH] (ref 5–8)
PLATELET # BLD AUTO: 240 K/UL (ref 150–450)
PMV BLD AUTO: 10.1 FL (ref 9.2–12.9)
POTASSIUM SERPL-SCNC: 3.6 MMOL/L (ref 3.5–5.1)
PROT SERPL-MCNC: 7.4 G/DL (ref 6–8.4)
PROT UR QL STRIP: NEGATIVE
RBC # BLD AUTO: 4.63 M/UL (ref 4–5.4)
RBC #/AREA URNS AUTO: 2 /HPF (ref 0–4)
SODIUM SERPL-SCNC: 144 MMOL/L (ref 136–145)
SP GR UR STRIP: 1.02 (ref 1–1.03)
SQUAMOUS #/AREA URNS AUTO: 6 /HPF
TRIGL SERPL-MCNC: 90 MG/DL (ref 30–150)
TSH SERPL DL<=0.005 MIU/L-ACNC: 0.61 UIU/ML (ref 0.4–4)
URN SPEC COLLECT METH UR: ABNORMAL
WBC # BLD AUTO: 4.47 K/UL (ref 3.9–12.7)
WBC #/AREA URNS AUTO: 5 /HPF (ref 0–5)

## 2022-11-08 PROCEDURE — 83036 HEMOGLOBIN GLYCOSYLATED A1C: CPT | Performed by: INTERNAL MEDICINE

## 2022-11-08 PROCEDURE — 36415 COLL VENOUS BLD VENIPUNCTURE: CPT | Performed by: INTERNAL MEDICINE

## 2022-11-08 PROCEDURE — 80053 COMPREHEN METABOLIC PANEL: CPT | Performed by: INTERNAL MEDICINE

## 2022-11-08 PROCEDURE — 81001 URINALYSIS AUTO W/SCOPE: CPT | Performed by: INTERNAL MEDICINE

## 2022-11-08 PROCEDURE — 85025 COMPLETE CBC W/AUTO DIFF WBC: CPT | Performed by: INTERNAL MEDICINE

## 2022-11-08 PROCEDURE — 84443 ASSAY THYROID STIM HORMONE: CPT | Performed by: INTERNAL MEDICINE

## 2022-11-08 PROCEDURE — 80061 LIPID PANEL: CPT | Performed by: INTERNAL MEDICINE

## 2022-11-09 RX ORDER — HYDROCHLOROTHIAZIDE 12.5 MG/1
12.5 TABLET ORAL DAILY
Qty: 30 TABLET | Refills: 3 | Status: SHIPPED | OUTPATIENT
Start: 2022-11-09 | End: 2023-02-13

## 2022-11-09 NOTE — TELEPHONE ENCOUNTER
----- Message from Mariana Gama sent at 11/9/2022  8:47 AM CST -----  Contact: [phone]  Requesting an RX refill or new RX.  Is this a refill or new RX: refill  RX name and strength (copy/paste from chart):  hydroCHLOROthiazide (HYDRODIURIL) 12.5 MG Tab  Is this a 30 day or 90 day RX: 90  Pharmacy name and phone # (copy/paste from chart):    [id number] [address] [address]  Phone: [phone] Fax: [phone]      The doctors have asked that we provide their patients with the following 2 reminders -- prescription refills can take up to 72 hours, and a friendly reminder that in the future you can use your [facility] account to request refills: aware

## 2022-11-17 ENCOUNTER — HOSPITAL ENCOUNTER (OUTPATIENT)
Dept: RADIOLOGY | Facility: HOSPITAL | Age: 57
Discharge: HOME | End: 2022-11-17
Attending: PHYSICIAN ASSISTANT
Payer: COMMERCIAL

## 2022-11-17 ENCOUNTER — HOSPITAL ENCOUNTER (OUTPATIENT)
Dept: RADIOLOGY | Facility: OTHER | Age: 57
Discharge: HOME | End: 2022-11-17
Attending: OBSTETRICS & GYNECOLOGY
Payer: COMMERCIAL

## 2022-11-17 ENCOUNTER — OFFICE VISIT (OUTPATIENT)
Dept: ORTHOPEDICS | Facility: CLINIC | Age: 57
End: 2022-11-17
Payer: COMMERCIAL

## 2022-11-17 VITALS — BODY MASS INDEX: 32.37 KG/M2 | WEIGHT: 189.63 LBS | HEIGHT: 64 IN

## 2022-11-17 DIAGNOSIS — M79.651 RIGHT THIGH PAIN: ICD-10-CM

## 2022-11-17 DIAGNOSIS — M25.551 RIGHT HIP PAIN: ICD-10-CM

## 2022-11-17 DIAGNOSIS — Z96.641 STATUS POST HIP REPLACEMENT, RIGHT: ICD-10-CM

## 2022-11-17 DIAGNOSIS — Z96.641 STATUS POST HIP REPLACEMENT, RIGHT: Primary | ICD-10-CM

## 2022-11-17 DIAGNOSIS — Z12.31 VISIT FOR SCREENING MAMMOGRAM: ICD-10-CM

## 2022-11-17 DIAGNOSIS — Z96.641 PRESENCE OF RIGHT ARTIFICIAL HIP JOINT: ICD-10-CM

## 2022-11-17 DIAGNOSIS — M25.551 RIGHT HIP PAIN: Primary | ICD-10-CM

## 2022-11-17 PROCEDURE — 77067 SCR MAMMO BI INCL CAD: CPT | Mod: TC

## 2022-11-17 PROCEDURE — 73502 XR HIP WITH PELVIS WHEN PERFORMED, 2 OR 3  VIEWS RIGHT: ICD-10-PCS | Mod: 26,RT,, | Performed by: RADIOLOGY

## 2022-11-17 PROCEDURE — 1159F PR MEDICATION LIST DOCUMENTED IN MEDICAL RECORD: ICD-10-PCS | Mod: CPTII,S$GLB,, | Performed by: PHYSICIAN ASSISTANT

## 2022-11-17 PROCEDURE — 99999 PR PBB SHADOW E&M-EST. PATIENT-LVL III: ICD-10-PCS | Mod: PBBFAC,,, | Performed by: PHYSICIAN ASSISTANT

## 2022-11-17 PROCEDURE — 77067 MAMMO DIGITAL SCREENING BILAT WITH TOMO: ICD-10-PCS | Mod: 26,,, | Performed by: RADIOLOGY

## 2022-11-17 PROCEDURE — 77063 BREAST TOMOSYNTHESIS BI: CPT | Mod: 26,,, | Performed by: RADIOLOGY

## 2022-11-17 PROCEDURE — 1160F PR REVIEW ALL MEDS BY PRESCRIBER/CLIN PHARMACIST DOCUMENTED: ICD-10-PCS | Mod: CPTII,S$GLB,, | Performed by: PHYSICIAN ASSISTANT

## 2022-11-17 PROCEDURE — 3008F BODY MASS INDEX DOCD: CPT | Mod: CPTII,S$GLB,, | Performed by: PHYSICIAN ASSISTANT

## 2022-11-17 PROCEDURE — 1159F MED LIST DOCD IN RCRD: CPT | Mod: CPTII,S$GLB,, | Performed by: PHYSICIAN ASSISTANT

## 2022-11-17 PROCEDURE — 99999 PR PBB SHADOW E&M-EST. PATIENT-LVL III: CPT | Mod: PBBFAC,,, | Performed by: PHYSICIAN ASSISTANT

## 2022-11-17 PROCEDURE — 73502 X-RAY EXAM HIP UNI 2-3 VIEWS: CPT | Mod: TC,RT

## 2022-11-17 PROCEDURE — 77063 BREAST TOMOSYNTHESIS BI: CPT | Mod: TC

## 2022-11-17 PROCEDURE — 77063 MAMMO DIGITAL SCREENING BILAT WITH TOMO: ICD-10-PCS | Mod: 26,,, | Performed by: RADIOLOGY

## 2022-11-17 PROCEDURE — 99203 OFFICE O/P NEW LOW 30 MIN: CPT | Mod: S$GLB,,, | Performed by: PHYSICIAN ASSISTANT

## 2022-11-17 PROCEDURE — 1160F RVW MEDS BY RX/DR IN RCRD: CPT | Mod: CPTII,S$GLB,, | Performed by: PHYSICIAN ASSISTANT

## 2022-11-17 PROCEDURE — 99203 PR OFFICE/OUTPT VISIT, NEW, LEVL III, 30-44 MIN: ICD-10-PCS | Mod: S$GLB,,, | Performed by: PHYSICIAN ASSISTANT

## 2022-11-17 PROCEDURE — 3044F HG A1C LEVEL LT 7.0%: CPT | Mod: CPTII,S$GLB,, | Performed by: PHYSICIAN ASSISTANT

## 2022-11-17 PROCEDURE — 3044F PR MOST RECENT HEMOGLOBIN A1C LEVEL <7.0%: ICD-10-PCS | Mod: CPTII,S$GLB,, | Performed by: PHYSICIAN ASSISTANT

## 2022-11-17 PROCEDURE — 3008F PR BODY MASS INDEX (BMI) DOCUMENTED: ICD-10-PCS | Mod: CPTII,S$GLB,, | Performed by: PHYSICIAN ASSISTANT

## 2022-11-17 PROCEDURE — 73502 X-RAY EXAM HIP UNI 2-3 VIEWS: CPT | Mod: 26,RT,, | Performed by: RADIOLOGY

## 2022-11-17 PROCEDURE — 73501 X-RAY EXAM HIP UNI 1 VIEW: CPT | Mod: TC,59,RT

## 2022-11-17 PROCEDURE — 73501 XR HIP WITH PELVIS WHEN PERFORMED, 1 VIEW RIGHT: ICD-10-PCS | Mod: 26,RT,, | Performed by: RADIOLOGY

## 2022-11-17 PROCEDURE — 77067 SCR MAMMO BI INCL CAD: CPT | Mod: 26,,, | Performed by: RADIOLOGY

## 2022-11-17 PROCEDURE — 73501 X-RAY EXAM HIP UNI 1 VIEW: CPT | Mod: 26,RT,, | Performed by: RADIOLOGY

## 2022-11-17 NOTE — PROGRESS NOTES
"  SUBJECTIVE:     Chief Complaint : right hip pain    History of Present Illness:  Phoeneshia L Jacques is a 56 y.o. female seen in clinic today with a chief complaint of right hip pain. Pt had R anterior MARGARITO by Dr. Archer at [facility] in 2021. Since surgery she has had pain in anterior hip when ascending stairs and when lifting leg to get in and out of car. There is no radiation of pain. Symptoms have not worsened or improved. Pain is tolerable and is 4/10 on VAS. She was seen by Dr. Archer's office and was told that everything looked good. She has tried Tylenol. She had L anterior MARGARITO done in 2020 at [facility] by Dr. Crabtree and has no left hip pain. She denies back pain, numbness/tingling, weakness of LE.     Past Medical History:   Diagnosis Date    Anxiety     Arthritis     Asthma     Depression     Hx of psychiatric care     Hypertension     Psychiatric problem     Sleep difficulties     Therapy      Review of Systems:  Constitutional: no fever or chills  ENT: no nasal congestion or sore throat  Respiratory: no cough or shortness of breath  Cardiovascular: no chest pain or palpitations  Gastrointestinal: no nausea or vomiting, tolerating diet  Genitourinary: no hematuria or dysuria  Integument/Breast: no rash or pruritis  Hematologic/Lymphatic: no easy bruising or lymphadenopathy  Musculoskeletal: see HPI  Neurological: no seizures or tremors  Behavioral/Psych: no auditory or visual hallucinations    OBJECTIVE:     PHYSICAL EXAM:  Height 5' 4" (1.626 m), weight 86 kg (189 lb 9.6 oz).   General Appearance: WDWN, NAD  Gait: Normal, no assistive device   Neuro/Psych: Mood & affect appropriate  Lungs: Respirations equal and unlabored.   CV: 2+ bilateral upper and lower extremity pulses.   Skin: Intact throughout LE  Extremities: No LE edema    Right Hip Examination:  Skin: + anterior scar   Tenderness:Anterior  ROM: pain with passive internal rotation, no pain with passive external rotation or flexion "    Flexion:100   Internal Rotation:20    External Rotation:50      Muscle Strength:    Abduction:5/5    Adduction:5/5    Flexion: + pain reproduced with resisted hip flexion   + pain with SLR     Left Hip Examination:  Skin: + anterior scar   Tenderness:None  ROM: no pain with PROM      RADIOGRAPHS: AP, cross table lateral and frog leg lateral hip xrays ordered and images reviewed today by me reveal bilateral total hips in good position with no fracture   ASSESSMENT/PLAN:   Right hip pain s/p R anterior MARGARITO  - GFR is less than 60, caution with NSAIDs. Tylenol prn.   - CRP and ESR   - MARS MRI   - F/u with Dr. Hickman

## 2022-12-05 ENCOUNTER — HOSPITAL ENCOUNTER (OUTPATIENT)
Dept: RADIOLOGY | Facility: HOSPITAL | Age: 57
Discharge: HOME | End: 2022-12-05
Attending: PHYSICIAN ASSISTANT
Payer: COMMERCIAL

## 2022-12-05 DIAGNOSIS — Z96.641 PRESENCE OF RIGHT ARTIFICIAL HIP JOINT: ICD-10-CM

## 2022-12-05 PROCEDURE — 73721 MRI JNT OF LWR EXTRE W/O DYE: CPT | Mod: TC,RT

## 2022-12-05 PROCEDURE — 73721 MRI JNT OF LWR EXTRE W/O DYE: CPT | Mod: 26,RT,, | Performed by: RADIOLOGY

## 2022-12-05 PROCEDURE — 73721 MRI HIP WITHOUT CONTRAST RIGHT: ICD-10-PCS | Mod: 26,RT,, | Performed by: RADIOLOGY

## 2022-12-06 ENCOUNTER — PATIENT MESSAGE (OUTPATIENT)
Dept: INTERNAL MEDICINE | Facility: CLINIC | Age: 57
End: 2022-12-06
Payer: COMMERCIAL

## 2022-12-09 ENCOUNTER — TELEPHONE (OUTPATIENT)
Dept: INTERNAL MEDICINE | Facility: CLINIC | Age: 57
End: 2022-12-09
Payer: COMMERCIAL

## 2022-12-09 NOTE — TELEPHONE ENCOUNTER
Please inform patient that labs are significant for an elevated cholesterol and a mildly depressed kidney filtration.  Recommend healthy diet and regular exercise, as well as adequate hydration and avoid use of NSAIDs when possible, like Advil and Aleve.

## 2023-01-26 ENCOUNTER — OFFICE VISIT (OUTPATIENT)
Dept: ORTHOPEDICS | Facility: CLINIC | Age: 58
End: 2023-01-26
Payer: COMMERCIAL

## 2023-01-26 VITALS — HEIGHT: 66 IN | WEIGHT: 193.56 LBS | BODY MASS INDEX: 31.11 KG/M2

## 2023-01-26 DIAGNOSIS — Z96.641 STATUS POST HIP REPLACEMENT, RIGHT: ICD-10-CM

## 2023-01-26 DIAGNOSIS — M76.891 HIP FLEXOR TENDINITIS, RIGHT: Primary | ICD-10-CM

## 2023-01-26 PROCEDURE — 99215 PR OFFICE/OUTPT VISIT, EST, LEVL V, 40-54 MIN: ICD-10-PCS | Mod: S$GLB,,, | Performed by: ORTHOPAEDIC SURGERY

## 2023-01-26 PROCEDURE — 3008F PR BODY MASS INDEX (BMI) DOCUMENTED: ICD-10-PCS | Mod: CPTII,S$GLB,, | Performed by: ORTHOPAEDIC SURGERY

## 2023-01-26 PROCEDURE — 1159F MED LIST DOCD IN RCRD: CPT | Mod: CPTII,S$GLB,, | Performed by: ORTHOPAEDIC SURGERY

## 2023-01-26 PROCEDURE — 99215 OFFICE O/P EST HI 40 MIN: CPT | Mod: S$GLB,,, | Performed by: ORTHOPAEDIC SURGERY

## 2023-01-26 PROCEDURE — 99999 PR PBB SHADOW E&M-EST. PATIENT-LVL III: ICD-10-PCS | Mod: PBBFAC,,, | Performed by: ORTHOPAEDIC SURGERY

## 2023-01-26 PROCEDURE — 3008F BODY MASS INDEX DOCD: CPT | Mod: CPTII,S$GLB,, | Performed by: ORTHOPAEDIC SURGERY

## 2023-01-26 PROCEDURE — 99999 PR PBB SHADOW E&M-EST. PATIENT-LVL III: CPT | Mod: PBBFAC,,, | Performed by: ORTHOPAEDIC SURGERY

## 2023-01-26 PROCEDURE — 1159F PR MEDICATION LIST DOCUMENTED IN MEDICAL RECORD: ICD-10-PCS | Mod: CPTII,S$GLB,, | Performed by: ORTHOPAEDIC SURGERY

## 2023-01-26 NOTE — PROGRESS NOTES
"  Subjective:     HPI:   Phoeneshia L Jacques is a 57 y.o. female who presents for eval R ant MARGARITO 2nd opinion    Per lauren garcia 11/17/21:   "History of Present Illness:  Phoeneshia L Jacques is a 56 y.o. female seen in clinic today with a chief complaint of right hip pain. Pt had R anterior MARGARITO by Dr. Archer at [facility] in 2021. Since surgery she has had pain in anterior hip when ascending stairs and when lifting leg to get in and out of car. There is no radiation of pain. Symptoms have not worsened or improved. Pain is tolerable and is 4/10 on VAS. She was seen by Dr. Archer's office and was told that everything looked good. She has tried Tylenol. She had L anterior MARGARITO done in 2020 at [facility] by Dr. Crabtree and has no left hip pain. She denies back pain, numbness/tingling, weakness of LE."       Right ant MARGARITO (2021) Dr. Archer -  The patient reported no issues with his inicision healing and no complications with infection after surgery. The patient spent 1 night(s) in the hospital. The patient had 8 weeks of out patient physical therapy. The patient stated that she has not healed well since surgery.      The patient has a history Left ant MARGARITO (2020) at [facility] with Dr. Crabtree -  The patient reported no issues with his inicision healing and no complications with infection after surgery. The patient spent 1 night(s) in the hospital. The patient had 8 weeks of out patient physical therapy. The patient stated that she healed well since surgery.  Since then  She says her left hip is doing well.  Her right hip has had persistent anterior pain with hip flexion since surgery.  She feels pain deep in the anterior hip especially when flexing the hip to do stairs and get into a car.  She denies low back lateral anterior thigh and radicular pain.  No paresthesias.  She was seen by Lauren Garcia in November.  Inflammatory markers were obtained and were normal.  In Lb MRI was ordered.  Symptoms are unchanged    Medications: " Tylenol (500mg) no improvement    Injections: none    Physical Therapy: None recently. The patient did complete OP-PT after her right hip surgery.     Assistive Devices: None recently. The patient did use a walker and a cane after surgery but stopped using them a couple weeks after her right hip surgery.     Walking: < 2 blocks    Limitations: General walking, difficulty going up/down steps, difficulty getting in/out of the car, and difficulty standing for long periods of time          Occupation: The patient is not currently working.     Social support: The patient stated that they live at home with their . The patient stated that their  would be able to help take care of them if they were to have surgery.        ROS:  The updated medical history is in the chart and has been reviewed. A review of systems is updated and there is no reported vision changes, ear/nose/mouth/throat complaints,  chest pain, shortness of breath, abdominal pain, urological complaints, fevers or chills, psychiatric complaints. Musculoskeletal and neurologcial symptoms are as documented. All other systems are negative.      Objective:   Exam:  There were no vitals filed for this visit.  Body mass index is 31.72 kg/m².    Physical examination included assessment of the patient's general appearance with particular attention to development, nutrition, body habitus, attention to grooming, and any evidence of distress.  Constitutional: The patient is a well-developed, well-nourished patient in no acute distress.   Cardiovascular: Vascular examination included warmth and capillary refill as well inspection for edema and assessment of pedal pulses. Pulses are palpable and regular.  Musculoskeletal: Gait was assessed as to whether it was steady, non-antalgic, and/or required the use of an assist device. The patient was also asked to walk independently and get onto the examination table.  Skin: The skin was examined for any obvious  rashes or lesions in the extremity.  Neurologic: Sensation is intact to light touch in the extremity. The patient has good coordination without hyperreflexia and is alert and oriented to person, place and time and has normal mood and affect.     All of the above were examined and found to be within normal limits except for the following pertinent clinical findings:    No limp nonantalgic gait negative Trendelenburg.  She has tenderness over both greater trochanters this is mild she says this is not the pain that she complains about.  Her anterior hip incision is well healed.  She has groin pain with active straight leg raise and pain with resisted hip flexion does recreate her symptoms.  0-90 hip flexion 40 abduction 30 adduction 30 external 20 internal rotation she is pain with flexion and internal rotation not flexion external rotation.  No significant limb length discrepancy supine she does not notice a difference. 5/5 HF strength      Imaging:    Indication:  Annual exam status post right total hip arthroplasty  Exam Ordered: Radiographs taken today include an anteroposterior pelvis, an AP and lateral view of the proximal femur including the hip joint  Details of Examination: Today's exam shows a well fixed, well positioned total hip arthroplasty with no evidence of wear, osteolysis, or loosening.  Impression:  Status post right total hip arthroplasty,     Cross table lateral shows 14mm anterior cup overhang    MRI does appear to show fluid along IP tendon not commented on by radiology    Impression:     1. Right total hip arthroplasty.  No convincing MR imaging findings to suggest osteolysis or aseptic loosening.  Suspected thin fibrous membrane formation along the distal aspect of the femoral stem.  Attenuated anterior capsule.  2. Mild thickening and surrounding edema of the iliotibial tract at the level of the greater trochanter, possibly related to chronic friction.  3. Attenuated distal fibers of the  gluteus minimus tendon suggesting chronic insufficiency.        Electronically signed by: Dick Grullon MD  Date:                                            12/05/2022  Time:                                           14:54      Assessment:       ICD-10-CM ICD-9-CM   1. Hip flexor tendinitis, right  M76.891 727.09   2. Status post hip replacement, right  Z96.641 V43.64      B ant MARGARITO, L 2020 [facility] 2021 Dr Archer  R IP tendonitis, ant cup overhang    CDK 1.1/59.7     Plan:       I think she has symptomatic iliopsoas and/or hip flexor tendinitis.  She does have mild abductor/IT band inflammation but I do not think that is her main pain generator she has done on both sides.      Would recommend sports medicine eval for ultrasound-guided iliopsoas corticosteroid injection.      I would like to see her back 2 weeks after injection for repeat exam    We discussed long-term options of arthroscopic iliopsoas tendon release versus cup revision    Orders Placed This Encounter   Procedures    Ambulatory referral/consult to Sports Medicine     Standing Status:   Future     Standing Expiration Date:   2/26/2024     Referral Priority:   Routine     Referral Type:   Consultation     Referral Reason:   Specialty Services Required     Requested Specialty:   Sports Medicine     Number of Visits Requested:   1             Past Medical History:   Diagnosis Date    Anxiety     Arthritis     Asthma     Depression     Hx of psychiatric care     Hypertension     Psychiatric problem     Sleep difficulties     Therapy        Past Surgical History:   Procedure Laterality Date    BREAST BIOPSY      essure      TOTAL HIP ARTHROPLASTY Left 01/23/2019    TUBAL LIGATION         Family History   Problem Relation Age of Onset    Depression Mother     Bipolar disorder Mother     Schizophrenia Mother     Heart disease Mother     Hypertension Mother     Cancer Paternal Aunt     Bipolar disorder Brother     Schizophrenia Brother     Learning  disabilities Paternal Uncle     Learning disabilities Son        Social History     Socioeconomic History    Marital status:      Spouse name: Guillermo    Number of children: 4   Occupational History     Employer:  NOT EMPLOYED   Tobacco Use    Smoking status: Never    Smokeless tobacco: Never   Substance and Sexual Activity    Alcohol use: No     Alcohol/week: 0.0 standard drinks    Drug use: No    Sexual activity: Yes     Partners: Male

## 2023-02-06 ENCOUNTER — OFFICE VISIT (OUTPATIENT)
Dept: SPORTS MEDICINE | Facility: CLINIC | Age: 58
End: 2023-02-06
Payer: COMMERCIAL

## 2023-02-06 VITALS
HEIGHT: 65 IN | WEIGHT: 190 LBS | HEART RATE: 76 BPM | BODY MASS INDEX: 31.65 KG/M2 | DIASTOLIC BLOOD PRESSURE: 72 MMHG | SYSTOLIC BLOOD PRESSURE: 106 MMHG

## 2023-02-06 DIAGNOSIS — S76.811S STRAIN OF ILIOPSOAS MUSCLE, RIGHT, SEQUELA: ICD-10-CM

## 2023-02-06 DIAGNOSIS — Z96.641 STATUS POST HIP REPLACEMENT, RIGHT: Primary | ICD-10-CM

## 2023-02-06 PROCEDURE — 3074F SYST BP LT 130 MM HG: CPT | Mod: CPTII,S$GLB,, | Performed by: ORTHOPAEDIC SURGERY

## 2023-02-06 PROCEDURE — 99999 PR PBB SHADOW E&M-EST. PATIENT-LVL III: ICD-10-PCS | Mod: PBBFAC,,, | Performed by: ORTHOPAEDIC SURGERY

## 2023-02-06 PROCEDURE — 3008F PR BODY MASS INDEX (BMI) DOCUMENTED: ICD-10-PCS | Mod: CPTII,S$GLB,, | Performed by: ORTHOPAEDIC SURGERY

## 2023-02-06 PROCEDURE — 76942 PR U/S GUIDANCE FOR NEEDLE GUIDANCE: ICD-10-PCS | Mod: RT,S$GLB,, | Performed by: ORTHOPAEDIC SURGERY

## 2023-02-06 PROCEDURE — 99499 NO LOS: ICD-10-PCS | Mod: S$GLB,,, | Performed by: ORTHOPAEDIC SURGERY

## 2023-02-06 PROCEDURE — 99999 PR PBB SHADOW E&M-EST. PATIENT-LVL III: CPT | Mod: PBBFAC,,, | Performed by: ORTHOPAEDIC SURGERY

## 2023-02-06 PROCEDURE — 99499 UNLISTED E&M SERVICE: CPT | Mod: S$GLB,,, | Performed by: ORTHOPAEDIC SURGERY

## 2023-02-06 PROCEDURE — 76942 ECHO GUIDE FOR BIOPSY: CPT | Mod: RT,S$GLB,, | Performed by: ORTHOPAEDIC SURGERY

## 2023-02-06 PROCEDURE — 20550 NJX 1 TENDON SHEATH/LIGAMENT: CPT | Mod: RT,S$GLB,, | Performed by: ORTHOPAEDIC SURGERY

## 2023-02-06 PROCEDURE — 3078F PR MOST RECENT DIASTOLIC BLOOD PRESSURE < 80 MM HG: ICD-10-PCS | Mod: CPTII,S$GLB,, | Performed by: ORTHOPAEDIC SURGERY

## 2023-02-06 PROCEDURE — 1160F RVW MEDS BY RX/DR IN RCRD: CPT | Mod: CPTII,S$GLB,, | Performed by: ORTHOPAEDIC SURGERY

## 2023-02-06 PROCEDURE — 1160F PR REVIEW ALL MEDS BY PRESCRIBER/CLIN PHARMACIST DOCUMENTED: ICD-10-PCS | Mod: CPTII,S$GLB,, | Performed by: ORTHOPAEDIC SURGERY

## 2023-02-06 PROCEDURE — 3078F DIAST BP <80 MM HG: CPT | Mod: CPTII,S$GLB,, | Performed by: ORTHOPAEDIC SURGERY

## 2023-02-06 PROCEDURE — 1159F MED LIST DOCD IN RCRD: CPT | Mod: CPTII,S$GLB,, | Performed by: ORTHOPAEDIC SURGERY

## 2023-02-06 PROCEDURE — 3008F BODY MASS INDEX DOCD: CPT | Mod: CPTII,S$GLB,, | Performed by: ORTHOPAEDIC SURGERY

## 2023-02-06 PROCEDURE — 20550 PR INJECT TENDON SHEATH/LIGAMENT: ICD-10-PCS | Mod: RT,S$GLB,, | Performed by: ORTHOPAEDIC SURGERY

## 2023-02-06 PROCEDURE — 1159F PR MEDICATION LIST DOCUMENTED IN MEDICAL RECORD: ICD-10-PCS | Mod: CPTII,S$GLB,, | Performed by: ORTHOPAEDIC SURGERY

## 2023-02-06 PROCEDURE — 3074F PR MOST RECENT SYSTOLIC BLOOD PRESSURE < 130 MM HG: ICD-10-PCS | Mod: CPTII,S$GLB,, | Performed by: ORTHOPAEDIC SURGERY

## 2023-02-06 RX ORDER — TRIAMCINOLONE ACETONIDE 40 MG/ML
40 INJECTION, SUSPENSION INTRA-ARTICULAR; INTRAMUSCULAR
Status: COMPLETED | OUTPATIENT
Start: 2023-02-06 | End: 2023-02-06

## 2023-02-06 RX ADMIN — TRIAMCINOLONE ACETONIDE 40 MG: 40 INJECTION, SUSPENSION INTRA-ARTICULAR; INTRAMUSCULAR at 11:02

## 2023-02-06 NOTE — PROGRESS NOTES
CC: Right hip pain     HPI: Phoeneshia is here today for ultrasound guided right hip iliopsoas bursa injection referred by Dr. Hickman. Patient reports her pain is 0/10 today. She gestures to the anterior aspect of the right hip when asked where she hurts and states her pain is increased with hip flexion.     PROCEDURE:  Bursa/peritendinous injection  Iliopsoas Bursa/peritendinous injection, right  Performed by: LUIS COYLE  Authorized by: LUIS COYLE  Consent Done?: Yes (Verbal and written)  Indications: Pain  Site marked: The procedure site was marked   Timeout: Prior to procedure the correct patient, procedure, and site was verified   Location: Iliopsoas, right  Prep: Patient was prepped with Chlorhexidine and alcohol.  Skin anesthetic: Ethyl Chloride spray was used prior to skin puncture.  Ultrasound Guidance for needle placement: yes  Needle size: 22 G, 2.5  Approach: Anterior, lateral to medial  Procedure: After skin anesthetic was applied, the needle was advanced toward the iliopsoas tendon using care to avoid any surrounding vascular structures.. A 3 cc mixture of 1 cc of 40 mg/ml triamcinolone acetonide and 2 cc of 0.2% ropivacaine was injected into the iliopsoas bursa and around the iliopsoas tendon.  Great care was taken to avoid any intra-articular infiltration.  Medications: 40 mg triamcinolone acetonide 40 mg/mL  Patient tolerance: Patient tolerated the procedure well with no immediate complications  Description of ultrasound utilization for needle guidance:    Ultrasound guidance was used for needle localization with SonoSite Edge 2, 8-3 MHz (C). Images were saved and stored for documentation. The anterior hip structures were well visualized. Dynamic visualization of the needle was continuous throughout the procedure and maintained good position and correct needle placement.       Triamcinolone:  NDC: [address]-7  LOT: [id number]  EXP: 08/2024       ASSESSMENT:  1. Status post hip replacement,  right    2. Strain of iliopsoas muscle, right, sequela        PLAN:   1.  Ultrasound-guided right iliopsoas bursa CSI performed today. No immediate complications and the procedure was tolerated well.  2.  She is to communicate with Dr. Hickman and his staff over the next several days to discuss her short-term and long-term response to the injection.  3.  All questions were answered to the best of my ability and all concerns were addressed at this time.  4.  Follow up with Dr. Hickman per his recommendation.    This note is dictated using the M*Modal Fluency Direct word recognition program. There are word recognition mistakes that are occasionally missed on review.

## 2023-02-13 RX ORDER — HYDROCHLOROTHIAZIDE 12.5 MG/1
TABLET ORAL
Qty: 30 TABLET | Refills: 3 | Status: SHIPPED | OUTPATIENT
Start: 2023-02-13 | End: 2023-07-11

## 2023-02-23 ENCOUNTER — OFFICE VISIT (OUTPATIENT)
Dept: PSYCHIATRY | Facility: CLINIC | Age: 58
End: 2023-02-23
Payer: COMMERCIAL

## 2023-02-23 VITALS
WEIGHT: 184.94 LBS | BODY MASS INDEX: 30.78 KG/M2 | SYSTOLIC BLOOD PRESSURE: 130 MMHG | DIASTOLIC BLOOD PRESSURE: 68 MMHG | HEART RATE: 85 BPM

## 2023-02-23 DIAGNOSIS — F40.10 SOCIAL ANXIETY DISORDER: ICD-10-CM

## 2023-02-23 DIAGNOSIS — F33.2 SEVERE EPISODE OF RECURRENT MAJOR DEPRESSIVE DISORDER, WITHOUT PSYCHOTIC FEATURES: Primary | ICD-10-CM

## 2023-02-23 DIAGNOSIS — R53.83 FATIGUE, UNSPECIFIED TYPE: ICD-10-CM

## 2023-02-23 DIAGNOSIS — F41.1 GENERALIZED ANXIETY DISORDER: ICD-10-CM

## 2023-02-23 PROCEDURE — 99999 PR PBB SHADOW E&M-EST. PATIENT-LVL III: ICD-10-PCS | Mod: PBBFAC,,, | Performed by: NURSE PRACTITIONER

## 2023-02-23 PROCEDURE — 99999 PR PBB SHADOW E&M-EST. PATIENT-LVL III: CPT | Mod: PBBFAC,,, | Performed by: NURSE PRACTITIONER

## 2023-02-23 PROCEDURE — 3008F BODY MASS INDEX DOCD: CPT | Mod: CPTII,S$GLB,, | Performed by: NURSE PRACTITIONER

## 2023-02-23 PROCEDURE — 3008F PR BODY MASS INDEX (BMI) DOCUMENTED: ICD-10-PCS | Mod: CPTII,S$GLB,, | Performed by: NURSE PRACTITIONER

## 2023-02-23 PROCEDURE — 1160F RVW MEDS BY RX/DR IN RCRD: CPT | Mod: CPTII,S$GLB,, | Performed by: NURSE PRACTITIONER

## 2023-02-23 PROCEDURE — 1159F PR MEDICATION LIST DOCUMENTED IN MEDICAL RECORD: ICD-10-PCS | Mod: CPTII,S$GLB,, | Performed by: NURSE PRACTITIONER

## 2023-02-23 PROCEDURE — 1159F MED LIST DOCD IN RCRD: CPT | Mod: CPTII,S$GLB,, | Performed by: NURSE PRACTITIONER

## 2023-02-23 PROCEDURE — 3078F PR MOST RECENT DIASTOLIC BLOOD PRESSURE < 80 MM HG: ICD-10-PCS | Mod: CPTII,S$GLB,, | Performed by: NURSE PRACTITIONER

## 2023-02-23 PROCEDURE — 3078F DIAST BP <80 MM HG: CPT | Mod: CPTII,S$GLB,, | Performed by: NURSE PRACTITIONER

## 2023-02-23 PROCEDURE — 99214 PR OFFICE/OUTPT VISIT, EST, LEVL IV, 30-39 MIN: ICD-10-PCS | Mod: S$GLB,,, | Performed by: NURSE PRACTITIONER

## 2023-02-23 PROCEDURE — 90833 PR PSYCHOTHERAPY W/PATIENT W/E&M, 30 MIN (ADD ON): ICD-10-PCS | Mod: S$GLB,,, | Performed by: NURSE PRACTITIONER

## 2023-02-23 PROCEDURE — 3075F PR MOST RECENT SYSTOLIC BLOOD PRESS GE 130-139MM HG: ICD-10-PCS | Mod: CPTII,S$GLB,, | Performed by: NURSE PRACTITIONER

## 2023-02-23 PROCEDURE — 99214 OFFICE O/P EST MOD 30 MIN: CPT | Mod: S$GLB,,, | Performed by: NURSE PRACTITIONER

## 2023-02-23 PROCEDURE — 3075F SYST BP GE 130 - 139MM HG: CPT | Mod: CPTII,S$GLB,, | Performed by: NURSE PRACTITIONER

## 2023-02-23 PROCEDURE — 1160F PR REVIEW ALL MEDS BY PRESCRIBER/CLIN PHARMACIST DOCUMENTED: ICD-10-PCS | Mod: CPTII,S$GLB,, | Performed by: NURSE PRACTITIONER

## 2023-02-23 PROCEDURE — 90833 PSYTX W PT W E/M 30 MIN: CPT | Mod: S$GLB,,, | Performed by: NURSE PRACTITIONER

## 2023-02-23 RX ORDER — FLUOXETINE HYDROCHLORIDE 20 MG/1
20 CAPSULE ORAL DAILY
Qty: 30 CAPSULE | Refills: 3 | Status: SHIPPED | OUTPATIENT
Start: 2023-02-23 | End: 2023-04-24 | Stop reason: DRUGHIGH

## 2023-02-23 RX ORDER — FLUOXETINE HYDROCHLORIDE 40 MG/1
40 CAPSULE ORAL DAILY
Qty: 30 CAPSULE | Refills: 3 | Status: SHIPPED | OUTPATIENT
Start: 2023-02-23 | End: 2023-03-25
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
## 2023-02-28 ENCOUNTER — OFFICE VISIT (OUTPATIENT)
Dept: ORTHOPEDICS | Facility: CLINIC | Age: 58
End: 2023-02-28
Payer: COMMERCIAL

## 2023-02-28 VITALS — WEIGHT: 186.75 LBS | BODY MASS INDEX: 31.11 KG/M2 | HEIGHT: 65 IN

## 2023-02-28 DIAGNOSIS — M76.891 HIP FLEXOR TENDINITIS, RIGHT: Primary | ICD-10-CM

## 2023-02-28 DIAGNOSIS — Z96.641 STATUS POST HIP REPLACEMENT, RIGHT: ICD-10-CM

## 2023-02-28 PROCEDURE — 1159F PR MEDICATION LIST DOCUMENTED IN MEDICAL RECORD: ICD-10-PCS | Mod: CPTII,S$GLB,, | Performed by: ORTHOPAEDIC SURGERY

## 2023-02-28 PROCEDURE — 99213 PR OFFICE/OUTPT VISIT, EST, LEVL III, 20-29 MIN: ICD-10-PCS | Mod: S$GLB,,, | Performed by: ORTHOPAEDIC SURGERY

## 2023-02-28 PROCEDURE — 1159F MED LIST DOCD IN RCRD: CPT | Mod: CPTII,S$GLB,, | Performed by: ORTHOPAEDIC SURGERY

## 2023-02-28 PROCEDURE — 3008F PR BODY MASS INDEX (BMI) DOCUMENTED: ICD-10-PCS | Mod: CPTII,S$GLB,, | Performed by: ORTHOPAEDIC SURGERY

## 2023-02-28 PROCEDURE — 99999 PR PBB SHADOW E&M-EST. PATIENT-LVL III: CPT | Mod: PBBFAC,,, | Performed by: ORTHOPAEDIC SURGERY

## 2023-02-28 PROCEDURE — 99213 OFFICE O/P EST LOW 20 MIN: CPT | Mod: S$GLB,,, | Performed by: ORTHOPAEDIC SURGERY

## 2023-02-28 PROCEDURE — 99999 PR PBB SHADOW E&M-EST. PATIENT-LVL III: ICD-10-PCS | Mod: PBBFAC,,, | Performed by: ORTHOPAEDIC SURGERY

## 2023-02-28 PROCEDURE — 3008F BODY MASS INDEX DOCD: CPT | Mod: CPTII,S$GLB,, | Performed by: ORTHOPAEDIC SURGERY

## 2023-02-28 NOTE — PROGRESS NOTES
2 week f/u R hip iliopsoas US guided injection 2/6/23 with Bonifacio Myers  S/p R ant MARGARITO Dr Archer 2021    85% relief, ongoing  Able to do stairs and HF much more easily  Still low level pain 3/10 pain with those activities but tolerable    Exam: same but less pain with provocative HF maneuvers    Continue tylenol, watch NSAIDS with CKD  Rec start with PT  If persists then options are repeat injection, hip scope IP lengthening/release with Hector Lazaro or Eloina or cup revision     Can't take time off for surgery at this time

## 2023-03-02 ENCOUNTER — CLINICAL SUPPORT (OUTPATIENT)
Dept: REHABILITATION | Facility: HOSPITAL | Age: 58
End: 2023-03-02
Attending: ORTHOPAEDIC SURGERY
Payer: COMMERCIAL

## 2023-03-02 DIAGNOSIS — Z96.641 STATUS POST HIP REPLACEMENT, RIGHT: ICD-10-CM

## 2023-03-02 DIAGNOSIS — M76.891 HIP FLEXOR TENDINITIS, RIGHT: Primary | ICD-10-CM

## 2023-03-02 PROCEDURE — 97530 THERAPEUTIC ACTIVITIES: CPT | Mod: PO

## 2023-03-02 PROCEDURE — 97161 PT EVAL LOW COMPLEX 20 MIN: CPT | Mod: PO

## 2023-03-02 PROCEDURE — 97110 THERAPEUTIC EXERCISES: CPT | Mod: PO

## 2023-03-02 NOTE — PROGRESS NOTES
"[facility]   Physical Therapy Initial Evaluation     Date: 3/2/2023   Name: Phoeneshia L Jacques  Clinic Number: [id number]    Therapy Diagnosis: No diagnosis found.  Physician: Darrel Hickman III, *    Physician Orders: PT Eval and Treat  Medical Diagnosis from Referral:  Z96.641 (ICD-10-CM) - Status post hip replacement, right   M76.891 (ICD-10-CM) - Hip flexor tendinitis, right     Evaluation Date: 3/2/2023  Authorization Period Expiration: 12/29/2023  Plan of Care Expiration: 04/14/2023  Progress Note Due: 04/02.2023  Visit # / Visits authorized: 1/20   FOTO: 0/3    Precautions: Standard     Time In: 11:10 am  Time Out: 12:05 pm  Total Appointment Time (timed & untimed codes): 55 minutes      SUBJECTIVE     Date of Onset: 2020    History of current condition - Phoeneshia reports a history of chronic right hip pain/tightness following a total hip arthroplasty (anterior approach) performed in 2020. She states she attended physical therapy following the surgery, and stopped going because she thought was strong enough that she would continue to get better independently. Patient states that her right hip has continued to "hurt and feel tight" since she stopped attending physical therapy, and was causing her difficulty with stair climbing. Phoeneshia underwent ultrasound guided injection on 02/06/2023 which decreased her pain and helped her to navigate stairs more easily, though she continues to c/o mild right hip pain and stiffness. Patient also reports numbness/tingling along the outside of her hip prior to the shot.    Falls: None.    Imaging, MRI studies (11/17/2022):  1. Right total hip arthroplasty.  No convincing MR imaging findings to suggest osteolysis or aseptic loosening.  Suspected thin fibrous membrane formation along the distal aspect of the femoral stem.  Attenuated anterior capsule.  2. Mild thickening and surrounding edema of the iliotibial tract at the level of the " greater trochanter, possibly related to chronic friction.  3. Attenuated distal fibers of the gluteus minimus tendon suggesting chronic insufficiency.    Prior Therapy: PT for bilateral THAs.  Social History: Lives with  and children, single story home with 3 [address] PA[address] with bilateral handrails.  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  Prior Level of Function: Independent, pain with ascending stairs.  Current Level of Function: Increased pain with ascending stairs.    Pain:  Current 0/10, worst 4/10, best 0/10   Location: Right Hip  Description: Heaviness.  Aggravating Factors: Ascending Stairs, Hip Flexion  Easing Factors: Cortisone Shot, Rest    Patients Goals: No pain with going up stairs.     Medical History:   Past Medical History:   Diagnosis Date    Anxiety     Arthritis     Asthma     Depression     Hx of psychiatric care     Hypertension     Psychiatric problem     Sleep difficulties     Therapy      Surgical History:   Phoeneshia L Jacques  has a past surgical history that includes Tubal ligation; essure; Breast biopsy; and Total hip arthroplasty (Left, 01/23/2019).    Medications:   Phoeneshia has a current medication list which includes the following prescription(s): acetaminophen, fluoxetine, fluoxetine, and hydrochlorothiazide.    Allergies:   Review of patient's allergies indicates:  No Known Allergies     OBJECTIVE     Lower Extremity Range of Motion:   Right PROM Left PROM   Hip Flexion 95* 95   Hip Abduction 45 45   Hip Extension -5 0   Hip Internal Rotation 15* 5   Hip External Rotation 40 40   Knee Flexion WNL WNL   Knee Extension WNL WNL   * = concordant pain, WNL = within normal limits    Lower Extremity Strength   Right Lower Extremity Left Lower Extremity   Hip Flexion 3-/5* 3+/5   Hip Abduction 3-/5 3+/5   Hip External Rotation 3+/5 3+/5   Hip Internal Rotation 3+/5 3+/5   * = concordant pain, WNL = within normal limits    Functional Tests:  30 Second Sit to Stand Test: 10 repetitions without  "hands    TREATMENT     Total Treatment time (time-based codes) separate from Evaluation: 30 minutes      Phoeneshia received the treatments listed below:      Therapeutic Exercises to develop strength, endurance, and ROM for 20 minutes including:  Stationary Bike: 8 minutes, level 1.0, to improve sagittal plan hip ROM  Prone on Elbows (Hip Flexor Stretch): 2 minutes  Bridges: 2x10 with 2" hold  Clam Shells: 2x15 with RTB, hooklying  Standing Hip Abduction: 2x10 reps  Standing Hip Flexion: 2x10 reps    Therapeutic Activities to improve functional performance for 10 minutes, including:  Patient Education (See Below)    PATIENT EDUCATION AND HOME EXERCISES     Education provided:   - Initial HEP  - Exercise Rationale: benefits of general exercises  - Home Walking Program  - Hip Flexor Tendinitis Pathophysiology    Written Home Exercises Provided:  Yes . Exercises were reviewed and Phoeneshia was able to demonstrate them prior to the end of the session.  Phoeneshia demonstrated good  understanding of the education provided. See EMR under Patient Instructions for exercises provided during therapy sessions.    ASSESSMENT     Phoeneshia is a 57 y.o. female referred to outpatient Physical Therapy with a medical diagnosis of:  Z96.641 (ICD-10-CM) - Status post hip replacement, right   M76.891 (ICD-10-CM) - Hip flexor tendinitis, right     Patient presents with decreased hip strength, decreased hip ROM, 30 second sit to stand score of 10 repetitions, and impaired functional status with regards to stair navigation and walking.    Patient prognosis is Good.   Patient will benefit from skilled outpatient Physical Therapy to address the deficits stated above and in the chart below, provide patient /family education, and to maximize patientt's level of independence.     Plan of care discussed with patient: Yes  Patient's spiritual, cultural and educational needs considered and patient is agreeable to the plan of care and goals as " stated below:     Anticipated Barriers for therapy: scheduling, compliance    Medical Necessity is demonstrated by the following  History  Co-morbidities and personal factors that may impact the plan of care Co-morbidities:   Past Medical History:   Diagnosis Date    Anxiety     Arthritis     Asthma     Depression     Hx of psychiatric care     Hypertension     Psychiatric problem     Sleep difficulties     Therapy      Personal Factors:   no deficits     moderate   Examination  Body Structures and Functions, activity limitations and participation restrictions that may impact the plan of care Body Regions:   lower extremities    Body Systems:    ROM  strength  gait    Participation Restrictions:   None    Activity limitations:   Learning and applying knowledge  no deficits    General Tasks and Commands  no deficits    Communication  no deficits    Mobility  walking  Stair Climbing    Self care  no deficits    Domestic Life  no deficits    Interactions/Relationships  no deficits    Life Areas  no deficits    Community and Social Life  no deficits         low   Clinical Presentation stable and uncomplicated low   Decision Making/ Complexity Score: low     Goals:  Short Term Goals: 4 weeks   Patient will be independent with initial HEP to improve therapy outcomes.  Patient will increase 30 second sit to stand score to >/=13 repetitions to demonstrate improved functional LE strength.  Patient will increase right hip strength to >/=3+/5 in all tested planes to improve tolerance of stair climbing and walking.  Patient will increase bilateral hip extension AROM/PROM to >/=5 degrees to improve her ability to stand upright while performing household duties.    Long Term Goals: 8 weeks   Patient will be independent with final HEP to improve therapy outcomes.  Patient will increase FOTO hip survey by >/=10% to demonstrate an improvement in self perceived functional status.  Patient will increase right hip strength to >/=4-/5  in all tested planes to improve tolerance with stair climbing and walking.  Patient will report 0/10 pain with ascending stairs to demonstrate an return to her PLOF.    PLAN   Plan of care Certification: 3/2/2023 to 04/14/2023.    Outpatient Physical Therapy 2 times weekly for 8 weeks to include the following interventions: Gait Training, Manual Therapy, Moist Heat/ Ice, Neuromuscular Re-ed, Patient Education, Self Care, Therapeutic Activities, Therapeutic Exercise, and Ultrasound.     Isaiah Maddox, PT    I CERTIFY THE NEED FOR THESE SERVICES FURNISHED UNDER THIS PLAN OF TREATMENT AND WHILE UNDER MY CARE   Physician's comments:     Physician's Signature: ___________________________________________________

## 2023-03-07 ENCOUNTER — TELEPHONE (OUTPATIENT)
Dept: PSYCHIATRY | Facility: CLINIC | Age: 58
End: 2023-03-07
Payer: COMMERCIAL

## 2023-03-07 NOTE — TELEPHONE ENCOUNTER
----- Message from Carola Olivares, PhD sent at 3/7/2023  9:06 AM CST -----  Regarding: Pt Appointment  Jeramie So,    I was scheduled to meet with this patient tomorrow for our initial appointment, but it looks like she canceled the appt. Just wanted to keep you informed. I hope that she is able to reschedule.

## 2023-03-09 ENCOUNTER — CLINICAL SUPPORT (OUTPATIENT)
Dept: REHABILITATION | Facility: HOSPITAL | Age: 58
End: 2023-03-09
Attending: ORTHOPAEDIC SURGERY
Payer: COMMERCIAL

## 2023-03-09 DIAGNOSIS — M76.891 HIP FLEXOR TENDINITIS, RIGHT: ICD-10-CM

## 2023-03-09 DIAGNOSIS — M16.12 PRIMARY OSTEOARTHRITIS OF LEFT HIP: Primary | Chronic | ICD-10-CM

## 2023-03-09 PROCEDURE — 97112 NEUROMUSCULAR REEDUCATION: CPT | Mod: PO

## 2023-03-09 PROCEDURE — 97110 THERAPEUTIC EXERCISES: CPT | Mod: PO

## 2023-03-09 NOTE — PROGRESS NOTES
"  Physical Therapy Daily Treatment Note     Name: Phoeneshia L Jacques  Clinic Number: [id number]    Therapy Diagnosis: No diagnosis found.  Physician: Darrel Hickman III, *    Visit Date: 3/9/2023  Physician Orders: PT Eval and Treat  Medical Diagnosis from Referral:  Z96.641 (ICD-10-CM) - Status post hip replacement, right   M76.891 (ICD-10-CM) - Hip flexor tendinitis, right      Evaluation Date: 3/2/2023  Authorization Period Expiration: 12/29/2023  Plan of Care Expiration: 04/14/2023  Progress Note Due: 04/02.2023  Visit # / Visits authorized: 1/20   FOTO: 0/3      Time In: 1500  Time Out: 1600  Total Billable Time: 55 minutes    Precautions: Standard    Subjective     Pt reports: "I was a little sore after our last visit. Otherwise I am good.".  She was not compliant with home exercise program.  Response to previous treatment: soreness  Functional change: first f/u    Pain: 4/10  Location: right hip      Objective     Phoeneshia received therapeutic exercises to develop strength, endurance, ROM, and flexibility for 45 minutes including:  -Stationary Bike: 8 minutes, level 2.0, to improve sagittal plan hip ROM, endurance, and strength  -Standing Hip ABD 2x15 vs RTB  -Standing Hip Extension 2x15 vs RTBStanding Hip Abduction: 2x10 reps  -Step-up on 6"step 2x10 each side  -Supine Active Straight Leg Raise - 12 reps - 3 hold (Active assistive on R with strap)  -Supine Hip Adduction 2x15 vs RTB  -Bridges: 2x10 with 2" hold  -Clam Shells: 2x15 with RTB, Hooklying      Phoeneshia participated in neuromuscular re-education activities to improve: Balance, Coordination, and Posture for 10 minutes. The following activities were included:  Side stepping 2x15 vs RTB  Monster stepping 2x15 vs RTB      Home Exercises Provided and Patient Education Provided     Education provided:   - HEP    Written Home Exercises Provided: Patient instructed to cont prior HEP.  Exercises were reviewed and Phoeneshia was able to demonstrate " them prior to the end of the session.  Phoeneshia demonstrated good  understanding of the education provided.     See EMR under Patient Instructions for exercises provided prior visit.    Assessment     Pt progressing well with progression of therex and standing exercises today. Pt continues to be limited by pain R hip pain and decreased BLE strength.     Phoeneshia Is progressing well towards her goals.   Pt prognosis is Good.     Pt will continue to benefit from skilled outpatient physical therapy to address the deficits listed in the problem list box on initial evaluation, provide pt/family education and to maximize pt's level of independence in the home and community environment.     Pt's spiritual, cultural and educational needs considered and pt agreeable to plan of care and goals.    Anticipated barriers to physical therapy: none    Goals:  Short Term Goals: 4 weeks   Patient will be independent with initial HEP to improve therapy outcomes.  Patient will increase 30 second sit to stand score to >/=13 repetitions to demonstrate improved functional LE strength.  Patient will increase right hip strength to >/=3+/5 in all tested planes to improve tolerance of stair climbing and walking.  Patient will increase bilateral hip extension AROM/PROM to >/=5 degrees to improve her ability to stand upright while performing household duties.     Long Term Goals: 8 weeks   Patient will be independent with final HEP to improve therapy outcomes.  Patient will increase FOTO hip survey by >/=10% to demonstrate an improvement in self perceived functional status.  Patient will increase right hip strength to >/=4-/5 in all tested planes to improve tolerance with stair climbing and walking.  Patient will report 0/10 pain with ascending stairs to demonstrate an return to her PLOF.    Plan     Plan of care Certification: 3/2/2023 to 04/14/2023.     Outpatient Physical Therapy 2 times weekly for 8 weeks to include the following  interventions: Gait Training, Manual Therapy, Moist Heat/ Ice, Neuromuscular Re-ed, Patient Education, Self Care, Therapeutic Activities, Therapeutic Exercise, and Ultrasound.     Zara Ramos, PT

## 2023-03-10 PROBLEM — M16.12 PRIMARY OSTEOARTHRITIS OF LEFT HIP: Chronic | Status: ACTIVE | Noted: 2018-09-24

## 2023-03-13 ENCOUNTER — CLINICAL SUPPORT (OUTPATIENT)
Dept: REHABILITATION | Facility: HOSPITAL | Age: 58
End: 2023-03-13
Attending: ORTHOPAEDIC SURGERY
Payer: COMMERCIAL

## 2023-03-13 DIAGNOSIS — M76.891 HIP FLEXOR TENDINITIS, RIGHT: ICD-10-CM

## 2023-03-13 DIAGNOSIS — M16.12 PRIMARY OSTEOARTHRITIS OF LEFT HIP: Primary | Chronic | ICD-10-CM

## 2023-03-13 PROCEDURE — 97010 HOT OR COLD PACKS THERAPY: CPT | Mod: PO

## 2023-03-13 PROCEDURE — 97530 THERAPEUTIC ACTIVITIES: CPT | Mod: PO

## 2023-03-13 PROCEDURE — 97110 THERAPEUTIC EXERCISES: CPT | Mod: PO

## 2023-03-13 PROCEDURE — 97112 NEUROMUSCULAR REEDUCATION: CPT | Mod: PO

## 2023-03-13 NOTE — PROGRESS NOTES
"  Physical Therapy Daily Treatment Note     Name: Phoeneshia L Jacques  Clinic Number: [id number]    Therapy Diagnosis:   Encounter Diagnoses   Name Primary?    Primary osteoarthritis of left hip Yes    Hip flexor tendinitis, right      Physician: Darrel Hickman III, *    Visit Date: 3/13/2023  Physician Orders: PT Eval and Treat  Medical Diagnosis from Referral:  Z96.641 (ICD-10-CM) - Status post hip replacement, right   M76.891 (ICD-10-CM) - Hip flexor tendinitis, right      Evaluation Date: 3/2/2023  Authorization Period Expiration: 12/29/2023  Plan of Care Expiration: 04/14/2023  Progress Note Due: 04/02.2023  Visit # / Visits authorized: 3/20   FOTO: 0/3    Time In: 8:00 am  Time Out: 9:10 am  Total Billable Time: 60 minutes (+10 minutes ice)    Precautions: Standard    Subjective     Patient reports "I think the shot is starting to wear off because I am starting to have more hip soreness during the day again."    She was partially compliant with home exercise program, performing only prone on elbows exercise.    Response to previous treatment: Soreness.  Functional change: More pain/soreness during the day with walking and stair climbing.    Pain: 0/10  Location: Right Hip      Objective     Phoeneshia received:    Therapeutic Exercise to develop strength, endurance, ROM, and flexibility for 40 minutes including:  -Stationary Bike: 10 minutes, level 3.0, to improve sagittal plan hip ROM, endurance, and strength  -Standing Hip Flexor Stretch: 3x30"  -Prone on Elbows (Hip Flexor Stretch) 1x2 minutes  -Prone Quad Strap Stretch: 3x30"  -Supine Straight Leg Raise with Strap Assist: 2x10 with 3" hold  -Bridges: 2x10 with 3" hold  -Side Lying Clam Shells: 2x15 reps, RTB, right    -Standing Hip Abduction Machine 2x15, 10# (not performed)  -Standing Hip Extension Machine: 2x15,10# (not performed)    Neuromuscular Re-Education activities to improve: Balance, Coordination, and Posture for 10 minutes. The following " "activities were included:  -Lateral Band Walks: 2x25ft, bilateral, RTB at ankles  -Monster Walks - Forwards: 2x25ft, RTB at ankles (not performed)    Therapeutic Activities to improve functional performance for 10 minutes, including:  -Sit to Stands: 3x10 reps with arms crossed  -Step Ups on 6" Step: 2x15 reps, bilateral    Home Exercises Provided and Patient Education Provided   Education provided:   - HEP    Written Home Exercises Provided: Patient instructed to cont prior HEP.  Exercises were reviewed and Phoeneshia was able to demonstrate them prior to the end of the session.  Phoeneshia demonstrated good  understanding of the education provided.     See EMR under Patient Instructions for exercises provided prior visit.    Assessment     Patient reporting slight increase in symptoms since starting therapy, potentially due to effects of hip injection wearing off. She has been only partially compliant with her HEP to this point, performing only gentle hip flexor stretching in prone on elbows position. Session focused on progression of hip strengthening and flexibility exercises which the patient had fair tolerance of. She reported increased pain in her right hip flexor with marching during step ups, which were modified to be performed with her right foot staying on step throughout exercise, which she was then able to complete without increased pain. Discussed importance of compliance with HEP, specifically with hip flexor flexibility exercises at home to decrease pain, which she verbalized understanding of.    Phoeneshia Is progressing well towards her goals.   Pt prognosis is Good.     Pt will continue to benefit from skilled outpatient physical therapy to address the deficits listed in the problem list box on initial evaluation, provide pt/family education and to maximize pt's level of independence in the home and community environment.     Pt's spiritual, cultural and educational needs considered and pt " agreeable to plan of care and goals.    Anticipated barriers to physical therapy: none    Goals:  Short Term Goals: 4 weeks   Patient will be independent with initial HEP to improve therapy outcomes.  Patient will increase 30 second sit to stand score to >/=13 repetitions to demonstrate improved functional LE strength.  Patient will increase right hip strength to >/=3+/5 in all tested planes to improve tolerance of stair climbing and walking.  Patient will increase bilateral hip extension AROM/PROM to >/=5 degrees to improve her ability to stand upright while performing household duties.     Long Term Goals: 8 weeks   Patient will be independent with final HEP to improve therapy outcomes.  Patient will increase FOTO hip survey by >/=10% to demonstrate an improvement in self perceived functional status.  Patient will increase right hip strength to >/=4-/5 in all tested planes to improve tolerance with stair climbing and walking.  Patient will report 0/10 pain with ascending stairs to demonstrate an return to her PLOF.    Plan     Continue progressing per patient tolerance and POC.    Plan of care Certification: 3/2/2023 to 04/14/2023.     Outpatient Physical Therapy 2 times weekly for 8 weeks to include the following interventions: Gait Training, Manual Therapy, Moist Heat/ Ice, Neuromuscular Re-ed, Patient Education, Self Care, Therapeutic Activities, Therapeutic Exercise, and Ultrasound.     Isaiah Maddox, PT

## 2023-03-23 ENCOUNTER — PATIENT MESSAGE (OUTPATIENT)
Dept: ORTHOPEDICS | Facility: CLINIC | Age: 58
End: 2023-03-23
Payer: COMMERCIAL

## 2023-04-21 ENCOUNTER — PATIENT MESSAGE (OUTPATIENT)
Dept: ADMINISTRATIVE | Facility: HOSPITAL | Age: 58
End: 2023-04-21
Payer: COMMERCIAL

## 2023-04-24 ENCOUNTER — OFFICE VISIT (OUTPATIENT)
Dept: PSYCHIATRY | Facility: CLINIC | Age: 58
End: 2023-04-24
Payer: COMMERCIAL

## 2023-04-24 VITALS
HEART RATE: 76 BPM | DIASTOLIC BLOOD PRESSURE: 56 MMHG | BODY MASS INDEX: 31.18 KG/M2 | WEIGHT: 187.38 LBS | SYSTOLIC BLOOD PRESSURE: 125 MMHG

## 2023-04-24 DIAGNOSIS — F33.2 SEVERE EPISODE OF RECURRENT MAJOR DEPRESSIVE DISORDER, WITHOUT PSYCHOTIC FEATURES: Primary | ICD-10-CM

## 2023-04-24 DIAGNOSIS — F40.10 SOCIAL ANXIETY DISORDER: ICD-10-CM

## 2023-04-24 DIAGNOSIS — F41.1 GENERALIZED ANXIETY DISORDER: ICD-10-CM

## 2023-04-24 PROCEDURE — 3078F DIAST BP <80 MM HG: CPT | Mod: CPTII,S$GLB,, | Performed by: NURSE PRACTITIONER

## 2023-04-24 PROCEDURE — 1159F PR MEDICATION LIST DOCUMENTED IN MEDICAL RECORD: ICD-10-PCS | Mod: CPTII,S$GLB,, | Performed by: NURSE PRACTITIONER

## 2023-04-24 PROCEDURE — 3008F PR BODY MASS INDEX (BMI) DOCUMENTED: ICD-10-PCS | Mod: CPTII,S$GLB,, | Performed by: NURSE PRACTITIONER

## 2023-04-24 PROCEDURE — 99214 PR OFFICE/OUTPT VISIT, EST, LEVL IV, 30-39 MIN: ICD-10-PCS | Mod: S$GLB,,, | Performed by: NURSE PRACTITIONER

## 2023-04-24 PROCEDURE — 3074F PR MOST RECENT SYSTOLIC BLOOD PRESSURE < 130 MM HG: ICD-10-PCS | Mod: CPTII,S$GLB,, | Performed by: NURSE PRACTITIONER

## 2023-04-24 PROCEDURE — 1159F MED LIST DOCD IN RCRD: CPT | Mod: CPTII,S$GLB,, | Performed by: NURSE PRACTITIONER

## 2023-04-24 PROCEDURE — 1160F PR REVIEW ALL MEDS BY PRESCRIBER/CLIN PHARMACIST DOCUMENTED: ICD-10-PCS | Mod: CPTII,S$GLB,, | Performed by: NURSE PRACTITIONER

## 2023-04-24 PROCEDURE — 3008F BODY MASS INDEX DOCD: CPT | Mod: CPTII,S$GLB,, | Performed by: NURSE PRACTITIONER

## 2023-04-24 PROCEDURE — 1160F RVW MEDS BY RX/DR IN RCRD: CPT | Mod: CPTII,S$GLB,, | Performed by: NURSE PRACTITIONER

## 2023-04-24 PROCEDURE — 99999 PR PBB SHADOW E&M-EST. PATIENT-LVL III: CPT | Mod: PBBFAC,,, | Performed by: NURSE PRACTITIONER

## 2023-04-24 PROCEDURE — 90833 PSYTX W PT W E/M 30 MIN: CPT | Mod: S$GLB,,, | Performed by: NURSE PRACTITIONER

## 2023-04-24 PROCEDURE — 90833 PR PSYCHOTHERAPY W/PATIENT W/E&M, 30 MIN (ADD ON): ICD-10-PCS | Mod: S$GLB,,, | Performed by: NURSE PRACTITIONER

## 2023-04-24 PROCEDURE — 99999 PR PBB SHADOW E&M-EST. PATIENT-LVL III: ICD-10-PCS | Mod: PBBFAC,,, | Performed by: NURSE PRACTITIONER

## 2023-04-24 PROCEDURE — 3078F PR MOST RECENT DIASTOLIC BLOOD PRESSURE < 80 MM HG: ICD-10-PCS | Mod: CPTII,S$GLB,, | Performed by: NURSE PRACTITIONER

## 2023-04-24 PROCEDURE — 99214 OFFICE O/P EST MOD 30 MIN: CPT | Mod: S$GLB,,, | Performed by: NURSE PRACTITIONER

## 2023-04-24 PROCEDURE — 3074F SYST BP LT 130 MM HG: CPT | Mod: CPTII,S$GLB,, | Performed by: NURSE PRACTITIONER

## 2023-04-24 RX ORDER — FLUOXETINE HYDROCHLORIDE 40 MG/1
40 CAPSULE ORAL DAILY
Qty: 30 CAPSULE | Refills: 3 | Status: SHIPPED | OUTPATIENT
Start: 2023-04-24 | End: 2023-05-24

## 2023-04-24 RX ORDER — FLUOXETINE HYDROCHLORIDE 20 MG/1
20 CAPSULE ORAL DAILY
Qty: 30 CAPSULE | Refills: 3 | Status: SHIPPED | OUTPATIENT
Start: 2023-04-24 | End: 2023-09-01 | Stop reason: SDUPTHER
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
## 2023-04-28 ENCOUNTER — PATIENT OUTREACH (OUTPATIENT)
Dept: ADMINISTRATIVE | Facility: HOSPITAL | Age: 58
End: 2023-04-28
Payer: COMMERCIAL

## 2023-05-02 ENCOUNTER — PATIENT OUTREACH (OUTPATIENT)
Dept: ADMINISTRATIVE | Facility: HOSPITAL | Age: 58
End: 2023-05-02
Payer: COMMERCIAL

## 2023-05-03 ENCOUNTER — PATIENT MESSAGE (OUTPATIENT)
Dept: PSYCHIATRY | Facility: CLINIC | Age: 58
End: 2023-05-03
Payer: COMMERCIAL

## 2023-05-09 ENCOUNTER — PATIENT MESSAGE (OUTPATIENT)
Dept: PSYCHIATRY | Facility: CLINIC | Age: 58
End: 2023-05-09
Payer: COMMERCIAL

## 2023-05-29 ENCOUNTER — OFFICE VISIT (OUTPATIENT)
Dept: SPORTS MEDICINE | Facility: CLINIC | Age: 58
End: 2023-05-29
Payer: COMMERCIAL

## 2023-05-29 VITALS
HEIGHT: 65 IN | WEIGHT: 186 LBS | HEART RATE: 62 BPM | BODY MASS INDEX: 30.99 KG/M2 | SYSTOLIC BLOOD PRESSURE: 124 MMHG | DIASTOLIC BLOOD PRESSURE: 84 MMHG

## 2023-05-29 DIAGNOSIS — S76.811S STRAIN OF ILIOPSOAS MUSCLE, RIGHT, SEQUELA: ICD-10-CM

## 2023-05-29 DIAGNOSIS — Z96.641 STATUS POST HIP REPLACEMENT, RIGHT: Primary | ICD-10-CM

## 2023-05-29 PROCEDURE — 3079F DIAST BP 80-89 MM HG: CPT | Mod: CPTII,S$GLB,, | Performed by: ORTHOPAEDIC SURGERY

## 2023-05-29 PROCEDURE — 3079F PR MOST RECENT DIASTOLIC BLOOD PRESSURE 80-89 MM HG: ICD-10-PCS | Mod: CPTII,S$GLB,, | Performed by: ORTHOPAEDIC SURGERY

## 2023-05-29 PROCEDURE — 99999 PR PBB SHADOW E&M-EST. PATIENT-LVL III: ICD-10-PCS | Mod: PBBFAC,,, | Performed by: ORTHOPAEDIC SURGERY

## 2023-05-29 PROCEDURE — 3008F PR BODY MASS INDEX (BMI) DOCUMENTED: ICD-10-PCS | Mod: CPTII,S$GLB,, | Performed by: ORTHOPAEDIC SURGERY

## 2023-05-29 PROCEDURE — 99214 OFFICE O/P EST MOD 30 MIN: CPT | Mod: 25,S$GLB,, | Performed by: ORTHOPAEDIC SURGERY

## 2023-05-29 PROCEDURE — 76942 PR U/S GUIDANCE FOR NEEDLE GUIDANCE: ICD-10-PCS | Mod: S$GLB,,, | Performed by: ORTHOPAEDIC SURGERY

## 2023-05-29 PROCEDURE — 99999 PR PBB SHADOW E&M-EST. PATIENT-LVL III: CPT | Mod: PBBFAC,,, | Performed by: ORTHOPAEDIC SURGERY

## 2023-05-29 PROCEDURE — 76942 ECHO GUIDE FOR BIOPSY: CPT | Mod: S$GLB,,, | Performed by: ORTHOPAEDIC SURGERY

## 2023-05-29 PROCEDURE — 20550 NJX 1 TENDON SHEATH/LIGAMENT: CPT | Mod: RT,S$GLB,, | Performed by: ORTHOPAEDIC SURGERY

## 2023-05-29 PROCEDURE — 1160F RVW MEDS BY RX/DR IN RCRD: CPT | Mod: CPTII,S$GLB,, | Performed by: ORTHOPAEDIC SURGERY

## 2023-05-29 PROCEDURE — 20550 PR INJECT TENDON SHEATH/LIGAMENT: ICD-10-PCS | Mod: RT,S$GLB,, | Performed by: ORTHOPAEDIC SURGERY

## 2023-05-29 PROCEDURE — 3074F PR MOST RECENT SYSTOLIC BLOOD PRESSURE < 130 MM HG: ICD-10-PCS | Mod: CPTII,S$GLB,, | Performed by: ORTHOPAEDIC SURGERY

## 2023-05-29 PROCEDURE — 3008F BODY MASS INDEX DOCD: CPT | Mod: CPTII,S$GLB,, | Performed by: ORTHOPAEDIC SURGERY

## 2023-05-29 PROCEDURE — 1159F MED LIST DOCD IN RCRD: CPT | Mod: CPTII,S$GLB,, | Performed by: ORTHOPAEDIC SURGERY

## 2023-05-29 PROCEDURE — 99214 PR OFFICE/OUTPT VISIT, EST, LEVL IV, 30-39 MIN: ICD-10-PCS | Mod: 25,S$GLB,, | Performed by: ORTHOPAEDIC SURGERY

## 2023-05-29 PROCEDURE — 3074F SYST BP LT 130 MM HG: CPT | Mod: CPTII,S$GLB,, | Performed by: ORTHOPAEDIC SURGERY

## 2023-05-29 PROCEDURE — 1159F PR MEDICATION LIST DOCUMENTED IN MEDICAL RECORD: ICD-10-PCS | Mod: CPTII,S$GLB,, | Performed by: ORTHOPAEDIC SURGERY

## 2023-05-29 PROCEDURE — 1160F PR REVIEW ALL MEDS BY PRESCRIBER/CLIN PHARMACIST DOCUMENTED: ICD-10-PCS | Mod: CPTII,S$GLB,, | Performed by: ORTHOPAEDIC SURGERY

## 2023-05-29 RX ORDER — TRIAMCINOLONE ACETONIDE 40 MG/ML
40 INJECTION, SUSPENSION INTRA-ARTICULAR; INTRAMUSCULAR
Status: COMPLETED | OUTPATIENT
Start: 2023-05-29 | End: 2023-05-29

## 2023-05-29 RX ADMIN — TRIAMCINOLONE ACETONIDE 40 MG: 40 INJECTION, SUSPENSION INTRA-ARTICULAR; INTRAMUSCULAR at 10:05

## 2023-05-29 NOTE — PROGRESS NOTES
CC: right hip pain    57 y.o. Female presents today for evaluation of her right hip pain. She had right hip MARGARITO by Dr. Archer at [facility] in 2021. She had right iliopsoas bursa CSI 02/06/2023 and admits to appreciating great improvement in her pain following until the last couple of weeks. She gestures to the anterior aspect of the right hip when asked where she hurts.  After the last injection she states that she did a couple weeks of physical therapy but stopped it as she was feeling better.  How long: Worsening over the past several weeks  What makes it better: Iliopsoas bursa CSI, Tylenol  What makes it worse: Stretching, stairs, getting into her 's truck   Does it radiate: Denies  Attempted treatments: She had right iliopsoas bursa CSI 02/06/2023 and admits to great improvement in her pain with this. She has been taking tylenol as needed.   Pain score: 4/10  Any mechanical symptoms: Denies  Feelings of instability: Denies  Affecting ADLs: Admits to this problem affecting her ability to perform ADLs    Occupation: Stay at home mom    PAST MEDICAL HISTORY:   Past Medical History:   Diagnosis Date    Anxiety     Arthritis     Asthma     Depression     Hx of psychiatric care     Hypertension     Psychiatric problem     Sleep difficulties     Therapy        PAST SURGICAL HISTORY:   Past Surgical History:   Procedure Laterality Date    BREAST BIOPSY      essure      TOTAL HIP ARTHROPLASTY Left 01/23/2019    TUBAL LIGATION         FAMILY HISTORY:   Family History   Problem Relation Age of Onset    Depression Mother     Bipolar disorder Mother     Schizophrenia Mother     Heart disease Mother     Hypertension Mother     Cancer Paternal Aunt     Bipolar disorder Brother     Schizophrenia Brother     Learning disabilities Paternal Uncle     Learning disabilities Son        SOCIAL HISTORY:   Social History     Socioeconomic History    Marital status:      Spouse name: Guillermo    Number of children: 4  "  Occupational History     Employer:  NOT EMPLOYED   Tobacco Use    Smoking status: Never    Smokeless tobacco: Never   Substance and Sexual Activity    Alcohol use: No     Alcohol/week: 0.0 standard drinks    Drug use: No    Sexual activity: Yes     Partners: Male       MEDICATIONS:     Current Outpatient Medications:     acetaminophen (TYLENOL) 500 MG tablet, Take by mouth., Disp: , Rfl:     FLUoxetine 20 MG capsule, Take 1 capsule (20 mg total) by mouth once daily. In addition to 40mg to equal 60mg., Disp: 30 capsule, Rfl: 3    hydroCHLOROthiazide (HYDRODIURIL) 12.5 MG Tab, TAKE 1 TABLET(12.5 MG) BY MOUTH EVERY DAY, Disp: 30 tablet, Rfl: 3    Current Facility-Administered Medications:     triamcinolone acetonide injection 40 mg, 40 mg, INTRABURSAL, 1 time in Clinic/HOD, Bonifacio Myers DO    ALLERGIES:   Review of patient's allergies indicates:  No Known Allergies     PHYSICAL EXAMINATION:  /84   Pulse 62   Ht 5' 5" (1.651 m)   Wt 84.4 kg (186 lb)   BMI 30.95 kg/m²   Vitals signs and nursing note have been reviewed.  General: In no acute distress, well developed, well nourished, no diaphoresis  Eyes: EOM full and smooth, no eye redness or discharge  HENT: normocephalic and atraumatic, neck supple, trachea midline, no nasal discharge, no external ear redness or discharge  Cardiovascular: no LE edema  Lungs: respirations non-labored, no conversational dyspnea   Abd: non-distended, no rigidity  MSK: no amputation or deformity, no swelling of extremities  Neuro: AAOx3, CN2-12 grossly intact  Skin: No rashes, warm and dry  Psychiatric: cooperative, pleasant, mood and affect appropriate for age    HIP: RIGHT  The affected hip is compared to the contralateral hip.    Observation:    There is no edema, erythema, or ecchymosis in the lumbosacral region.   No obvious pelvic obliquity while standing.    No thoracolumbar scoliosis observed.    No midline skin abnormalities.  No atrophy noted in the lower " limbs.    ROM:   Passive hip flexion to 120° on left and 110° on right.    Passive hip internal rotation to 45° on left and 40° on right.    Passive hip external rotation to 45° on left and 40° on right.    Passive hip abduction to 45° on left and 35° on right.    Pain present with internal and external rotation    Tenderness To Palpation:  + tenderness at the ASIS, AIIS  No tenderness at the PSIS, PIIS, iliac crest, pubic bones, ischial tuberosity.  No tenderness throughout the lumbar spine, iliolumbar region, or posterior pelvis.  No tenderness throughout the sacrum or piriformis  No tenderness over the greater trochanteric bursa or greater/lesser trochanters.  No tenderness at the glut attachments on the greater trochanter  No tenderness over proximal IT band or hip flexor musculature.  + tenderness over the proximal hip flexor musculature    Strength Testing:  Hip flexion - 5/5 on left and 5/5 on right  Hip extension - 5/5 on left and 5/5 on right  Hip adduction - 5/5 on left and 5/5 on right  Hip abduction - 5/5 on left and 5/5 on right  Knee flexion - 5/5 on left and 5/5 on right  Knee extension - 5/5 on left and 5/5 on right    Special Tests:  Seated straight leg raise - negative  Supine straight leg raise - negative  Hamstring flexibility symmetric  Quadriceps flexibility symmetric.    Log roll - negative  PERI - negative  FADIR - negative  Scour test - negative  No pain with posterior hip capsule compression    ASIS compression test - negative  SI drawer test - negative   Thigh thrust test - negative     Neurovascular Exam:  Right antalgic gait  2+ femoral, DP, and PT pulses BL.  No skin changes, no abnormal hair distribution.  Sensation intact to light touch throughout the obturator and medial/lateral/posterior femoral cutaneous nerves.  Capillary refill intact to <2 seconds in all lower limb digits.      IMAGIN. X-ray obtained 2022 due to right hip pain   2. X-ray images were reviewed  personally by me and then directly with patient.  3. FINDINGS: X-ray images obtained demonstrate right hip MARGARITO with possible heterotopic ossification anteriorly  4. IMPRESSION: As above.     1. MRI obtained 12/05/2022 due to right hip pain   2. MRI images were reviewed personally by me and then directly with patient.  3. FINDINGS: MRI images obtained demonstrate right hip MARGARITO, mild thicken and surrounding edema of the iliotibial tract at the level of the greater trochanter, attenuated distal fibers of the gluteus minimus  4. IMPRESSION: As above.       PROCEDURE:  Bursa/peritendinous injection  Iliopsoas Bursa/peritendinous injection, right  Performed by: LUIS COYLE  Authorized by: LUIS COYLE  Consent Done?: Yes (Verbal and written)  Indications: Pain  Site marked: The procedure site was marked   Timeout: Prior to procedure the correct patient, procedure, and site was verified   Location: Iliopsoas, right  Prep: Patient was prepped with Chlorhexidine and alcohol.  Skin anesthetic: Ethyl Chloride spray was used prior to skin puncture.  Ultrasound Guidance for needle placement: yes  Needle size: 22 G, 2.5  Approach: Anterior, lateral to medial  Procedure: After skin anesthetic was applied, the needle was advanced toward the iliopsoas tendon using care to avoid any surrounding vascular structures.. A 3 cc mixture of 1 cc of 40 mg/ml triamcinolone acetonide and 2 cc of 0.2% ropivacaine was injected into the iliopsoas bursa and around the iliopsoas tendon.  Great care was taken to avoid any intra-articular infiltration.  Medications: 40 mg triamcinolone acetonide 40 mg/mL  Patient tolerance: Patient tolerated the procedure well with no immediate complications  Description of ultrasound utilization for needle guidance:    Ultrasound guidance was used for needle localization with SonoSite Edge 2, 8-3 MHz (C). Images were saved and stored for documentation. The anterior hip structures were well visualized.  Dynamic visualization of the needle was continuous throughout the procedure and maintained good position and correct needle placement.       Triamcinolone:  NDC: [address]-7  LOT: [id number]  EXP: 12/2024       ASSESSMENT:      ICD-10-CM ICD-9-CM   1. Status post hip replacement, right  Z96.641 V43.64   2. Strain of iliopsoas muscle, right, sequela  S76.911S 905.7         PLAN:  1-2.  History of hip replacement/iliopsoas muscle strain - improved, then deteriorated    - Phoeneshia admits to right anterior hip pain that has been gradually worsening over the past several weeks without known mechanism of injury. She has a prior history of right hip MARGARITO by Dr. Archer in 2021. She had right hip iliopsoas bursa CSI at visit 02/06/2023 and recalls great improvement in her pain following. Symptoms started to return within the past few weeks.    - XRs obtained 11/17/2022 and MRI obtained 12/05/2022 and images were personally reviewed by me. See above for further detail.    - After discussing options she elects to proceed with ultrasound guided right hip iliopsoas bursa CSI today. See above for procedure detail.     - External physical therapy order placed today. Paper order handed to patient.  The importance of strengthening and stabilizing around the affected hip was emphasized today and she states that she will complete a full physical therapy course at this time.  I advised that it is unlikely that repeat injections will be needed every 3-4 months and this issue should be corrected with proper strengthening and stabilizing.      Future planning includes - next steps pending response to injection and PT, OMT if indicated    All questions were answered to the best of my ability and all concerns were addressed at this time.    Follow up as needed.       This note is dictated using the M*Modal Fluency Direct word recognition program. There are word recognition mistakes that are occasionally missed on review.      Total time  spent face-to face with patient counseling or coordinating care including prognosis, differential diagnosis, risks and benefits of treatment, instructions, compliance risk reductions as well as non-face-to-face time personally spent reviewing medial record, medical documentation, and coordination of care.     EST MINUTES X   81234 10-19    46521 20-29    78543 30-39 X   99215 40-54    NEW     98144 15-29    31692 30-44    52198 45-59    43502 60-74    PHONE      5-10    74510 11-20    62439 21-30

## 2023-07-11 RX ORDER — HYDROCHLOROTHIAZIDE 12.5 MG/1
12.5 TABLET ORAL DAILY
Qty: 90 TABLET | Refills: 1 | Status: SHIPPED | OUTPATIENT
Start: 2023-07-11 | End: 2023-10-15 | Stop reason: SDUPTHER

## 2023-07-11 NOTE — TELEPHONE ENCOUNTER
Refill Decision Note   Phoeneshia Jacques  is requesting a refill authorization.  Brief Assessment and Rationale for Refill:  Approve     Medication Therapy Plan:         Comments:     Note composed:2:14 PM 07/11/2023             Appointments     Last Visit   11/7/2022 Yuly Bowen MD   Next Visit   7/31/2023 Yuly Bowen MD

## 2023-07-11 NOTE — TELEPHONE ENCOUNTER
No care due was identified.  [facility] Embedded Care Due Messages. Reference number: [id number].   7/11/2023 4:46:01 AM CDT

## 2023-09-04 RX ORDER — FLUOXETINE HYDROCHLORIDE 20 MG/1
20 CAPSULE ORAL DAILY
Qty: 30 CAPSULE | Refills: 3 | Status: SHIPPED | OUTPATIENT
Start: 2023-09-04 | End: 2023-09-20 | Stop reason: SDUPTHER

## 2023-09-04 RX ORDER — FLUOXETINE HYDROCHLORIDE 20 MG/1
20 CAPSULE ORAL DAILY
Qty: 30 CAPSULE | Refills: 3 | Status: SHIPPED | OUTPATIENT
Start: 2023-09-04 | End: 2024-01-16 | Stop reason: DRUGHIGH

## 2023-09-11 ENCOUNTER — PATIENT MESSAGE (OUTPATIENT)
Dept: ADMINISTRATIVE | Facility: HOSPITAL | Age: 58
End: 2023-09-11
Payer: COMMERCIAL

## 2023-09-20 ENCOUNTER — PATIENT MESSAGE (OUTPATIENT)
Dept: PSYCHIATRY | Facility: CLINIC | Age: 58
End: 2023-09-20
Payer: COMMERCIAL

## 2023-09-20 RX ORDER — FLUOXETINE HYDROCHLORIDE 40 MG/1
40 CAPSULE ORAL DAILY
Qty: 30 CAPSULE | Refills: 3 | Status: SHIPPED | OUTPATIENT
Start: 2023-09-20 | End: 2023-10-20

## 2023-09-20 RX ORDER — FLUOXETINE HYDROCHLORIDE 20 MG/1
20 CAPSULE ORAL DAILY
Qty: 30 CAPSULE | Refills: 3 | Status: SHIPPED | OUTPATIENT
Start: 2023-09-20 | End: 2024-09-19

## 2023-10-15 NOTE — TELEPHONE ENCOUNTER
Care Due:                  Date            Visit Type   Department     Provider  --------------------------------------------------------------------------------                                NP -                              PRIMARY      [facility] INTERNAL  Last Visit: 11-      CARE ([facility])   MEDICINE       Yuly Bowen                              MYCHART                              FOLLOWUP/OF  [facility] INTERNAL  Next Visit: 11-      FICE VISIT   MEDICINE       Yuly Bowen                                                            Last  Test          Frequency    Reason                     Performed    Due Date  --------------------------------------------------------------------------------    CMP.........  12 months..  hydroCHLOROthiazide......  11- 11-    [facility] Embedded Care Due Messages. Reference number: [id number].   10/15/2023 10:46:21 AM CDT

## 2023-10-16 RX ORDER — HYDROCHLOROTHIAZIDE 12.5 MG/1
12.5 TABLET ORAL DAILY
Qty: 90 TABLET | Refills: 0 | Status: SHIPPED | OUTPATIENT
Start: 2023-10-16

## 2023-10-16 NOTE — TELEPHONE ENCOUNTER
Provider Staff:  Action required for this patient     Please see care gap opportunities below in Care Due Message.    Thanks!  [facility]     Appointments      Date Provider   Last Visit   11/7/2022 Yuly Bowen MD   Next Visit   11/24/2023 Yuly Bowen MD     Refill Decision Note   Phoeneshia Jacques  is requesting a refill authorization.  Brief Assessment and Rationale for Refill:  Approve     Medication Therapy Plan:         Comments:     Note composed:2:38 PM 10/16/2023

## 2023-10-19 ENCOUNTER — PATIENT MESSAGE (OUTPATIENT)
Dept: PSYCHIATRY | Facility: CLINIC | Age: 58
End: 2023-10-19
Payer: COMMERCIAL

## 2023-11-15 DIAGNOSIS — I10 ESSENTIAL HYPERTENSION: ICD-10-CM

## 2023-11-24 ENCOUNTER — OFFICE VISIT (OUTPATIENT)
Dept: INTERNAL MEDICINE | Facility: CLINIC | Age: 58
End: 2023-11-24
Payer: COMMERCIAL

## 2023-11-24 ENCOUNTER — LAB VISIT (OUTPATIENT)
Dept: LAB | Facility: HOSPITAL | Age: 58
End: 2023-11-24
Attending: INTERNAL MEDICINE
Payer: COMMERCIAL

## 2023-11-24 VITALS
DIASTOLIC BLOOD PRESSURE: 68 MMHG | HEIGHT: 65 IN | HEART RATE: 66 BPM | RESPIRATION RATE: 19 BRPM | BODY MASS INDEX: 29.9 KG/M2 | SYSTOLIC BLOOD PRESSURE: 116 MMHG | TEMPERATURE: 97 F | OXYGEN SATURATION: 100 % | WEIGHT: 179.44 LBS

## 2023-11-24 DIAGNOSIS — Z12.31 VISIT FOR SCREENING MAMMOGRAM: ICD-10-CM

## 2023-11-24 DIAGNOSIS — Z00.00 ROUTINE MEDICAL EXAM: Primary | ICD-10-CM

## 2023-11-24 DIAGNOSIS — Z23 NEED FOR VACCINATION: ICD-10-CM

## 2023-11-24 DIAGNOSIS — Z00.00 ROUTINE MEDICAL EXAM: ICD-10-CM

## 2023-11-24 LAB
ALBUMIN SERPL BCP-MCNC: 4.1 G/DL (ref 3.5–5.2)
ALP SERPL-CCNC: 108 U/L (ref 55–135)
ALT SERPL W/O P-5'-P-CCNC: 13 U/L (ref 10–44)
ANION GAP SERPL CALC-SCNC: 10 MMOL/L (ref 8–16)
AST SERPL-CCNC: 17 U/L (ref 10–40)
BASOPHILS # BLD AUTO: 0.03 K/UL (ref 0–0.2)
BASOPHILS NFR BLD: 0.6 % (ref 0–1.9)
BILIRUB SERPL-MCNC: 0.6 MG/DL (ref 0.1–1)
BILIRUB UR QL STRIP: NEGATIVE
BUN SERPL-MCNC: 13 MG/DL (ref 6–20)
CALCIUM SERPL-MCNC: 9.6 MG/DL (ref 8.7–10.5)
CHLORIDE SERPL-SCNC: 103 MMOL/L (ref 95–110)
CHOLEST SERPL-MCNC: 250 MG/DL (ref 120–199)
CHOLEST/HDLC SERPL: 4 {RATIO} (ref 2–5)
CLARITY UR REFRACT.AUTO: CLEAR
CO2 SERPL-SCNC: 28 MMOL/L (ref 23–29)
COLOR UR AUTO: YELLOW
CREAT SERPL-MCNC: 0.9 MG/DL (ref 0.5–1.4)
DIFFERENTIAL METHOD: ABNORMAL
EOSINOPHIL # BLD AUTO: 0.1 K/UL (ref 0–0.5)
EOSINOPHIL NFR BLD: 1.5 % (ref 0–8)
ERYTHROCYTE [DISTWIDTH] IN BLOOD BY AUTOMATED COUNT: 14.6 % (ref 11.5–14.5)
EST. GFR  (NO RACE VARIABLE): >60 ML/MIN/1.73 M^2
ESTIMATED AVG GLUCOSE: 105 MG/DL (ref 68–131)
GLUCOSE SERPL-MCNC: 89 MG/DL (ref 70–110)
GLUCOSE UR QL STRIP: NEGATIVE
HBA1C MFR BLD: 5.3 % (ref 4–5.6)
HCT VFR BLD AUTO: 40.4 % (ref 37–48.5)
HDLC SERPL-MCNC: 63 MG/DL (ref 40–75)
HDLC SERPL: 25.2 % (ref 20–50)
HGB BLD-MCNC: 12.7 G/DL (ref 12–16)
HGB UR QL STRIP: NEGATIVE
IMM GRANULOCYTES # BLD AUTO: 0.01 K/UL (ref 0–0.04)
IMM GRANULOCYTES NFR BLD AUTO: 0.2 % (ref 0–0.5)
KETONES UR QL STRIP: NEGATIVE
LDLC SERPL CALC-MCNC: 168.8 MG/DL (ref 63–159)
LEUKOCYTE ESTERASE UR QL STRIP: NEGATIVE
LYMPHOCYTES # BLD AUTO: 2.2 K/UL (ref 1–4.8)
LYMPHOCYTES NFR BLD: 41 % (ref 18–48)
MCH RBC QN AUTO: 27.1 PG (ref 27–31)
MCHC RBC AUTO-ENTMCNC: 31.4 G/DL (ref 32–36)
MCV RBC AUTO: 86 FL (ref 82–98)
MONOCYTES # BLD AUTO: 0.3 K/UL (ref 0.3–1)
MONOCYTES NFR BLD: 4.9 % (ref 4–15)
NEUTROPHILS # BLD AUTO: 2.7 K/UL (ref 1.8–7.7)
NEUTROPHILS NFR BLD: 51.8 % (ref 38–73)
NITRITE UR QL STRIP: NEGATIVE
NONHDLC SERPL-MCNC: 187 MG/DL
NRBC BLD-RTO: 0 /100 WBC
PH UR STRIP: 7 [PH] (ref 5–8)
PLATELET # BLD AUTO: 258 K/UL (ref 150–450)
PMV BLD AUTO: 10.4 FL (ref 9.2–12.9)
POTASSIUM SERPL-SCNC: 3.6 MMOL/L (ref 3.5–5.1)
PROT SERPL-MCNC: 7.5 G/DL (ref 6–8.4)
PROT UR QL STRIP: NEGATIVE
RBC # BLD AUTO: 4.69 M/UL (ref 4–5.4)
SODIUM SERPL-SCNC: 141 MMOL/L (ref 136–145)
SP GR UR STRIP: 1.02 (ref 1–1.03)
TRIGL SERPL-MCNC: 91 MG/DL (ref 30–150)
TSH SERPL DL<=0.005 MIU/L-ACNC: 0.99 UIU/ML (ref 0.4–4)
URN SPEC COLLECT METH UR: NORMAL
WBC # BLD AUTO: 5.29 K/UL (ref 3.9–12.7)

## 2023-11-24 PROCEDURE — 3008F BODY MASS INDEX DOCD: CPT | Mod: CPTII,S$GLB,, | Performed by: INTERNAL MEDICINE

## 2023-11-24 PROCEDURE — 99999 PR PBB SHADOW E&M-EST. PATIENT-LVL IV: CPT | Mod: PBBFAC,,, | Performed by: INTERNAL MEDICINE

## 2023-11-24 PROCEDURE — 80061 LIPID PANEL: CPT | Performed by: INTERNAL MEDICINE

## 2023-11-24 PROCEDURE — 90686 FLU VACCINE (QUAD) GREATER THAN OR EQUAL TO 3YO PRESERVATIVE FREE IM: ICD-10-PCS | Mod: S$GLB,,, | Performed by: INTERNAL MEDICINE

## 2023-11-24 PROCEDURE — 3078F DIAST BP <80 MM HG: CPT | Mod: CPTII,S$GLB,, | Performed by: INTERNAL MEDICINE

## 2023-11-24 PROCEDURE — 3008F PR BODY MASS INDEX (BMI) DOCUMENTED: ICD-10-PCS | Mod: CPTII,S$GLB,, | Performed by: INTERNAL MEDICINE

## 2023-11-24 PROCEDURE — 80053 COMPREHEN METABOLIC PANEL: CPT | Performed by: INTERNAL MEDICINE

## 2023-11-24 PROCEDURE — 3078F PR MOST RECENT DIASTOLIC BLOOD PRESSURE < 80 MM HG: ICD-10-PCS | Mod: CPTII,S$GLB,, | Performed by: INTERNAL MEDICINE

## 2023-11-24 PROCEDURE — 3074F SYST BP LT 130 MM HG: CPT | Mod: CPTII,S$GLB,, | Performed by: INTERNAL MEDICINE

## 2023-11-24 PROCEDURE — 1159F PR MEDICATION LIST DOCUMENTED IN MEDICAL RECORD: ICD-10-PCS | Mod: CPTII,S$GLB,, | Performed by: INTERNAL MEDICINE

## 2023-11-24 PROCEDURE — 85025 COMPLETE CBC W/AUTO DIFF WBC: CPT | Performed by: INTERNAL MEDICINE

## 2023-11-24 PROCEDURE — 3044F PR MOST RECENT HEMOGLOBIN A1C LEVEL <7.0%: ICD-10-PCS | Mod: CPTII,S$GLB,, | Performed by: INTERNAL MEDICINE

## 2023-11-24 PROCEDURE — 99396 PR PREVENTIVE VISIT,EST,40-64: ICD-10-PCS | Mod: 25,S$GLB,, | Performed by: INTERNAL MEDICINE

## 2023-11-24 PROCEDURE — 99396 PREV VISIT EST AGE 40-64: CPT | Mod: 25,S$GLB,, | Performed by: INTERNAL MEDICINE

## 2023-11-24 PROCEDURE — 36415 COLL VENOUS BLD VENIPUNCTURE: CPT | Mod: PO | Performed by: INTERNAL MEDICINE

## 2023-11-24 PROCEDURE — 84443 ASSAY THYROID STIM HORMONE: CPT | Performed by: INTERNAL MEDICINE

## 2023-11-24 PROCEDURE — 1160F PR REVIEW ALL MEDS BY PRESCRIBER/CLIN PHARMACIST DOCUMENTED: ICD-10-PCS | Mod: CPTII,S$GLB,, | Performed by: INTERNAL MEDICINE

## 2023-11-24 PROCEDURE — G0008 ADMIN INFLUENZA VIRUS VAC: HCPCS | Mod: S$GLB,,, | Performed by: INTERNAL MEDICINE

## 2023-11-24 PROCEDURE — 1160F RVW MEDS BY RX/DR IN RCRD: CPT | Mod: CPTII,S$GLB,, | Performed by: INTERNAL MEDICINE

## 2023-11-24 PROCEDURE — 99999 PR PBB SHADOW E&M-EST. PATIENT-LVL IV: ICD-10-PCS | Mod: PBBFAC,,, | Performed by: INTERNAL MEDICINE

## 2023-11-24 PROCEDURE — 90686 IIV4 VACC NO PRSV 0.5 ML IM: CPT | Mod: S$GLB,,, | Performed by: INTERNAL MEDICINE

## 2023-11-24 PROCEDURE — 81003 URINALYSIS AUTO W/O SCOPE: CPT | Performed by: INTERNAL MEDICINE

## 2023-11-24 PROCEDURE — G0008 FLU VACCINE (QUAD) GREATER THAN OR EQUAL TO 3YO PRESERVATIVE FREE IM: ICD-10-PCS | Mod: S$GLB,,, | Performed by: INTERNAL MEDICINE

## 2023-11-24 PROCEDURE — 1159F MED LIST DOCD IN RCRD: CPT | Mod: CPTII,S$GLB,, | Performed by: INTERNAL MEDICINE

## 2023-11-24 PROCEDURE — 3074F PR MOST RECENT SYSTOLIC BLOOD PRESSURE < 130 MM HG: ICD-10-PCS | Mod: CPTII,S$GLB,, | Performed by: INTERNAL MEDICINE

## 2023-11-24 PROCEDURE — 3044F HG A1C LEVEL LT 7.0%: CPT | Mod: CPTII,S$GLB,, | Performed by: INTERNAL MEDICINE

## 2023-11-24 PROCEDURE — 83036 HEMOGLOBIN GLYCOSYLATED A1C: CPT | Performed by: INTERNAL MEDICINE

## 2023-11-24 RX ORDER — SULFACETAMIDE SODIUM 100 MG/ML
1 SOLUTION/ DROPS OPHTHALMIC 4 TIMES DAILY
Qty: 5 ML | Refills: 0 | Status: SHIPPED | OUTPATIENT
Start: 2023-11-24

## 2023-11-24 RX ORDER — SEMAGLUTIDE 0.25 MG/.5ML
0.25 INJECTION, SOLUTION SUBCUTANEOUS
Qty: 2 ML | Refills: 0 | Status: SHIPPED | OUTPATIENT
Start: 2023-11-24

## 2023-11-24 NOTE — PROGRESS NOTES
The patient is a 57 y.o. old female who presents to the office for a physical.    PAST MEDICAL HISTORY  Past Medical History:   Diagnosis Date    Anxiety     Arthritis     Asthma     Depression     Hx of psychiatric care     Hypertension     Psychiatric problem     Sleep difficulties     Therapy        SURGICAL HISTORY:  Past Surgical History:   Procedure Laterality Date    BREAST BIOPSY      essure      TOTAL HIP ARTHROPLASTY Left 01/23/2019    TUBAL LIGATION           MEDS:  Medcard reviewed and updated    ALLERGIES: Allergy Card reviewed and updated    SOCIAL HISTORY:   The patient is a nonsmoker, denies alcohol or illicit drug use.    ROS:  GENERAL: No fever, chills, fatigability or weight loss.  SKIN: No rashes.  HEAD: No headaches or recent head trauma.  EYES: No photophobia, ocular pain or diplopia.  Positive pink eye of left eye.  Positive itching and crusting this morning.  EARS: Denies ear pain, discharge or vertigo.  NOSE: No epistaxis or postnasal drip.  MOUTH & THROAT: No hoarseness or change in voice.   NODES: Denies swollen glands.  CHEST: Denies shortness of breath, wheezing, cough and sputum production.  CARDIOVASCULAR: Denies chest pain or palpitations.  ABDOMEN: Appetite fine. Denies diarrhea, abdominal pain, constipation or blood in stool.  URINARY: No dysuria or hematuria.  MUSCULOSKELETAL: No joint stiffness or swelling. Denies back pain.  NEUROLOGIC: No history of seizures.  ENDOCRINE: Denies polyuria or polydipsia.  PSYCHIATRIC: Denies mood swings, depression, anxiety, homicidal or suicidal thoughts.  Positive difficulty getting along with people.  She does not like going out.  She lacks motivation.    SCREENINGS:  Last cholesterol:2022 .  Last colonoscopy: 2019  Last mammogram: 2022  Last Pap smear: 2021  Last tetanus: unknown  Last Pneumovax: none  Last eye exam: 2023  Last bone density: none  Last menstrual period: postmenopausal    PE:   Vitals:  Vitals:    11/24/23 0843   BP: 116/68    Pulse: 66   Resp: 19   Temp: 97.3 °F (36.3 °C)       APPEARANCE: Well nourished, well developed, in no acute distress.    EYES: Sclerae anicteric. PERRL. EOMI.      EARS: TM's intact. No retraction or perforation.    NOSE: Mucosa pink. Airway clear.  MOUTH & THROAT: No tonsillar enlargement. No pharyngeal erythema or exudate. No stridor.  NECK: Supple, no thyromegaly.  CHEST: Lungs clear to auscultation with unlabored respirations.  CARDIOVASCULAR: Normal S1, S2. No murmurs. No carotid bruits. No pedal edema.  ABDOMEN: Bowel sounds normal. Not distended. Soft. No tenderness or masses. No organomegaly.  MUSCULOSKELETAL:  Normal gait, no cyanosis or clubbing. Stength 5//5 in all 4 extremities.   SKIN: Normal skin turgor, warm and dry.  NEUROLOGIC:       Cranial Nerves: Intact.      DTR's: Knees, 2+ and equal bilaterally.  PSYCHIATRIC: The patient is oriented to person, place, and time and has a pleasant affect.        ASSESSMENT/PLAN:  Phoenescarlos manuel was seen today for depression and anxiety.    Diagnoses and all orders for this visit:    Routine medical exam  -     CBC Auto Differential; Future  -     Comprehensive Metabolic Panel; Future  -     Lipid Panel; Future  -     TSH; Future  -     Hemoglobin A1C; Future  -     Urinalysis; Future    Visit for screening mammogram  -     Mammo Digital Screening Bilat w/ Michele; Future    Other orders  -     semaglutide, weight loss, (WEGOVY) 0.25 mg/0.5 mL PnIj; Inject 0.25 mg into the skin every 7 days.  -     sulfacetamide sodium 10% (BLEPH-10) 10 % ophthalmic solution; Place 1 drop into the left eye 4 (four) times daily.            Answers submitted by the patient for this visit:  Review of Systems Questionnaire (Submitted on 11/21/2023)  activity change: No  unexpected weight change: Yes  neck pain: No  hearing loss: No  rhinorrhea: No  trouble swallowing: No  eye discharge: No  visual disturbance: No  chest tightness: No  wheezing: No  chest pain: No  palpitations: No  blood  in stool: No  constipation: No  vomiting: No  diarrhea: No  polydipsia: No  polyuria: No  difficulty urinating: No  hematuria: No  menstrual problem: No  dysuria: No  joint swelling: No  arthralgias: No  headaches: No  weakness: No  confusion: Yes  dysphoric mood: Yes

## 2023-11-30 ENCOUNTER — TELEPHONE (OUTPATIENT)
Dept: INTERNAL MEDICINE | Facility: CLINIC | Age: 58
End: 2023-11-30
Payer: COMMERCIAL

## 2023-12-21 ENCOUNTER — HOSPITAL ENCOUNTER (OUTPATIENT)
Dept: RADIOLOGY | Facility: HOSPITAL | Age: 58
Discharge: HOME | End: 2023-12-21
Attending: INTERNAL MEDICINE
Payer: COMMERCIAL

## 2023-12-21 DIAGNOSIS — Z12.31 VISIT FOR SCREENING MAMMOGRAM: ICD-10-CM

## 2023-12-21 PROCEDURE — 77067 SCR MAMMO BI INCL CAD: CPT | Mod: TC

## 2023-12-21 PROCEDURE — 77063 BREAST TOMOSYNTHESIS BI: CPT | Mod: 26,,, | Performed by: RADIOLOGY

## 2023-12-21 PROCEDURE — 77067 SCR MAMMO BI INCL CAD: CPT | Mod: 26,,, | Performed by: RADIOLOGY

## 2023-12-21 PROCEDURE — 77063 MAMMO DIGITAL SCREENING BILAT WITH TOMO: ICD-10-PCS | Mod: 26,,, | Performed by: RADIOLOGY

## 2023-12-21 PROCEDURE — 77067 MAMMO DIGITAL SCREENING BILAT WITH TOMO: ICD-10-PCS | Mod: 26,,, | Performed by: RADIOLOGY

## 2024-01-16 ENCOUNTER — OFFICE VISIT (OUTPATIENT)
Dept: PSYCHIATRY | Facility: CLINIC | Age: 59
End: 2024-01-16
Payer: COMMERCIAL

## 2024-01-16 DIAGNOSIS — F33.2 SEVERE EPISODE OF RECURRENT MAJOR DEPRESSIVE DISORDER, WITHOUT PSYCHOTIC FEATURES: Primary | ICD-10-CM

## 2024-01-16 DIAGNOSIS — M79.10 MYALGIA: ICD-10-CM

## 2024-01-16 DIAGNOSIS — F40.10 SOCIAL ANXIETY DISORDER: ICD-10-CM

## 2024-01-16 DIAGNOSIS — R53.83 FATIGUE, UNSPECIFIED TYPE: ICD-10-CM

## 2024-01-16 DIAGNOSIS — F41.1 GENERALIZED ANXIETY DISORDER: ICD-10-CM

## 2024-01-16 PROCEDURE — 99214 OFFICE O/P EST MOD 30 MIN: CPT | Mod: 95,,, | Performed by: NURSE PRACTITIONER

## 2024-01-16 PROCEDURE — 1159F MED LIST DOCD IN RCRD: CPT | Mod: CPTII,95,, | Performed by: NURSE PRACTITIONER

## 2024-01-16 PROCEDURE — 1160F RVW MEDS BY RX/DR IN RCRD: CPT | Mod: CPTII,95,, | Performed by: NURSE PRACTITIONER

## 2024-01-16 RX ORDER — FLUOXETINE HYDROCHLORIDE 40 MG/1
80 CAPSULE ORAL DAILY
Qty: 60 CAPSULE | Refills: 3 | Status: SHIPPED | OUTPATIENT
Start: 2024-01-16 | End: 2024-02-15

## 2024-01-16 RX ORDER — BUPROPION HYDROCHLORIDE 150 MG/1
150 TABLET ORAL DAILY
Qty: 30 TABLET | Refills: 3 | Status: SHIPPED | OUTPATIENT
Start: 2024-01-16 | End: 2024-06-10
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
## 2024-01-19 ENCOUNTER — PATIENT MESSAGE (OUTPATIENT)
Dept: PSYCHIATRY | Facility: CLINIC | Age: 59
End: 2024-01-19
Payer: COMMERCIAL

## 2024-01-21 ENCOUNTER — PATIENT MESSAGE (OUTPATIENT)
Dept: PSYCHIATRY | Facility: CLINIC | Age: 59
End: 2024-01-21
Payer: COMMERCIAL

## 2024-04-05 NOTE — TELEPHONE ENCOUNTER
Care Due:                  Date            Visit Type   Department     Provider  --------------------------------------------------------------------------------                                MYCHART                              FOLLOWUP/OF  [facility] INTERNAL  Last Visit: 11-      FICE VISIT   MEDICINE       Yuly Bowen  Next Visit: None Scheduled  None         None Found                                                            Last  Test          Frequency    Reason                     Performed    Due Date  --------------------------------------------------------------------------------    HBA1C.......  6 months...  semaglutide,.............  11- 05-    [facility] Embedded Care Due Messages. Reference number: [id number].   4/05/2024 4:49:21 AM CDT

## 2024-04-06 RX ORDER — HYDROCHLOROTHIAZIDE 12.5 MG/1
12.5 TABLET ORAL
Qty: 90 TABLET | Refills: 2 | Status: SHIPPED | OUTPATIENT
Start: 2024-04-06 | End: 2024-06-18

## 2024-04-06 NOTE — TELEPHONE ENCOUNTER
Provider Staff:  Action required for this patient     Please see care gap opportunities below in Care Due Message:   A1C due 5/22/24    Thanks!  [facility]     Appointments      Date Provider   Last Visit   11/24/2023 Yuly Bowen MD   Next Visit   Visit date not found Yuly Bowen MD     Refill Decision Note   Phoeneshia Jacques  is requesting a refill authorization.  Brief Assessment and Rationale for Refill:  Approve     Medication Therapy Plan:         Comments:     Note composed:12:30 AM 04/06/2024

## 2024-04-18 RX ORDER — FLUOXETINE HYDROCHLORIDE 20 MG/1
20 CAPSULE ORAL DAILY
Qty: 30 CAPSULE | Refills: 3 | Status: SHIPPED | OUTPATIENT
Start: 2024-04-18 | End: 2024-04-25 | Stop reason: DRUGHIGH

## 2024-04-25 ENCOUNTER — PATIENT MESSAGE (OUTPATIENT)
Dept: PSYCHIATRY | Facility: CLINIC | Age: 59
End: 2024-04-25
Payer: COMMERCIAL

## 2024-04-25 RX ORDER — FLUOXETINE HYDROCHLORIDE 40 MG/1
40 CAPSULE ORAL DAILY
Qty: 30 CAPSULE | Refills: 3 | Status: SHIPPED | OUTPATIENT
Start: 2024-04-25 | End: 2024-06-03

## 2024-05-15 ENCOUNTER — PATIENT MESSAGE (OUTPATIENT)
Dept: PSYCHIATRY | Facility: CLINIC | Age: 59
End: 2024-05-15
Payer: COMMERCIAL

## 2024-06-01 ENCOUNTER — NURSE TRIAGE (OUTPATIENT)
Dept: ADMINISTRATIVE | Facility: CLINIC | Age: 59
End: 2024-06-01
Payer: COMMERCIAL

## 2024-06-01 NOTE — TELEPHONE ENCOUNTER
Detail Level: Simple Pt started taking fluoxetine 40 mg 2 capsules daily in May and just realized the dose was 1 capsule daily instead of 2. Care advice to call poison center now. Pt was given number of [phone]. Will send message to MD that prescribed medication. Instructed patient to call back with additional questions or concerns. Pt verbalized understanding.   Reason for Disposition   MORE THAN A DOUBLE DOSE of a prescription or over-the-counter (OTC) drug    Additional Information   Negative: [1] Intentional drug overdose AND [2] suicidal thoughts or ideas    Protocols used: Medication Question Call-A-AH     Additional Notes: Patient consent was obtained to proceed with the visit and recommended plan of care after discussion of all risks and benefits, including the risks of COVID-19 exposure.

## 2024-06-03 ENCOUNTER — TELEPHONE (OUTPATIENT)
Dept: PSYCHIATRY | Facility: CLINIC | Age: 59
End: 2024-06-03
Payer: COMMERCIAL

## 2024-06-03 DIAGNOSIS — F33.2 SEVERE EPISODE OF RECURRENT MAJOR DEPRESSIVE DISORDER, WITHOUT PSYCHOTIC FEATURES: Primary | ICD-10-CM

## 2024-06-03 DIAGNOSIS — F41.1 GENERALIZED ANXIETY DISORDER: ICD-10-CM

## 2024-06-03 RX ORDER — FLUOXETINE HYDROCHLORIDE 40 MG/1
80 CAPSULE ORAL DAILY
Qty: 60 CAPSULE | Refills: 0 | Status: SHIPPED | OUTPATIENT
Start: 2024-06-03 | End: 2024-06-10

## 2024-06-03 NOTE — TELEPHONE ENCOUNTER
Called pt d/t weekend nursing note that pt called to report she's been taking fluoxetine 80mg/d.   Reports accidentally taking higher dose since 5/21/24. Denies SEs. Wasn't sure if she was supposed to be taking 40mg or 60mg.   Chart reviewed - last seen by her psych NP Dr. Ng in January, plan made to increase to 80mg/d per note. Eventually 60mg was ordered in April, but pt says she mightve made some mistakes along the way between requesting new orders of 40mg and 20mg capsules.    Called poison center as recommend and was told to skip a day and resume 40mg today. Says felt bad skipping dose. Took 40mg as directed this AM.   Briefly shares feels like medication not helping much anyway. Scheduled for Dr. Ng 7/8/24 but advised she could check if another provider available sooner in Dr. Ng's absence. Pt states she didn't want to see another provider, fearing lack of rapport.   Offered to see pt in interim and pt agreeable. Given she's tolerated fluoxetine 80mg before abrupt d/c but has only been at higher dose for less than 2 weeks, pt agreed to try taking higher dose for 1 more week to allow for more adequate trial and then can consider alternative strategies as needed. Agreed to virtual appt next week 6/10/24 for interim med mgmt with f/u with Dr. Ng in July as scheduled.

## 2024-06-10 ENCOUNTER — TELEPHONE (OUTPATIENT)
Dept: PSYCHIATRY | Facility: CLINIC | Age: 59
End: 2024-06-10
Payer: COMMERCIAL

## 2024-06-10 ENCOUNTER — DOCUMENTATION ONLY (OUTPATIENT)
Dept: PSYCHIATRY | Facility: CLINIC | Age: 59
End: 2024-06-10
Payer: COMMERCIAL

## 2024-06-10 ENCOUNTER — CLINICAL SUPPORT (OUTPATIENT)
Dept: PSYCHIATRY | Facility: CLINIC | Age: 59
End: 2024-06-10
Payer: COMMERCIAL

## 2024-06-10 DIAGNOSIS — F33.2 SEVERE EPISODE OF RECURRENT MAJOR DEPRESSIVE DISORDER, WITHOUT PSYCHOTIC FEATURES: Primary | ICD-10-CM

## 2024-06-10 DIAGNOSIS — F41.1 GENERALIZED ANXIETY DISORDER: ICD-10-CM

## 2024-06-10 DIAGNOSIS — F40.10 SOCIAL ANXIETY DISORDER: ICD-10-CM

## 2024-06-10 NOTE — PROGRESS NOTES
"[facility]  Pharmacy Note     VISIT DATE:  6/10/2024    The patient location is: [place] - pt is at home address on file  Visit type: Audiovisual     HPI: 58 y.o. female with a psychiatric hx of MDD, severe without psychotic features, RALPH, and social anxiety disorder and medical hx of HTN, obesity and fatigue seen for INTERIM medication management f/u. Last seen by usual psychiatric provider Dr. Ng Jan 2024 at which time plan made to:  Increase fluoxeetine to 80mg/d for depression and anxiety.   Restart bupropion XL 150mg      In the interim - she was increased to only fluoxetine 60mg, and accidentally started taking higher dose of 80mg on 5/21/24. Spoke with pt last week and given ongoing depression and reported tolerability, advised to continue higher dose for another week to allow for adequate trial    Adherence:  partial, not taking buproipion, doesn't know when she stopped  Side effects: denies     Today, pt describes moods as I just have this sadness I can't get rid off.....I feel stuck.... have this dark cloud over me." Pt describes feeling depressed daily, anhedonia, amotivation, low energy, irritability, feelings of guilt and passive SI. Feels med not helping. Also describes "Intrusive thoughts" that distract her easily. Also overeats. Denies sleep issues, "better now", was taking melatonin nightly and effective, but experienced next day sedation, now helps sleep onset by listening to music, denies issues with sleep maintenace. "I do what I have to do." Cares for her son with autism, but knows she could be doing more, spending more time with him. Not interested  in cooking. "I dont know how to socialize .... I feel awkward", wants to be able to socialize bekah with 's big family.  pushes her to attend [employer].  Was hiding depression from  for many years. Son did well on fluoxetine so was hopeful would work for her. Describes long standing depression, but "I was good " "at masking it" for many years. Worsened after menopause. Denies active si/hi. Cites caring for her son and her Hinduism, feeling only "God can take me out", as strong protective factors. Describes stressors "out the wazoo"- daughter homeless x 2 years, blames her. Guilt - feels she may have waited too late to have son, blames herself for his autism, blames herself for her kids' conditions and that daughters might have depression too. Looked into [facility], but deductible issues stopped her from proceeding. Also voices low confidence in therapy effectiveness.   Mom with hx bipolar and psychosis.     Never started Wegovy for obesity d/t shortage.     Past Psychiatric History:   Previous medications:   - zolpidem - daytime grogginess  - Sertraline - ineffective at 100 mg  - Effexor- effective initially for 2 days a week, with more time ineffective even at highest dose, +sexual SEs  - Bupropion - ineffective at 150mg or 300mg, started for sexual SEs 2/2 venlafaxine  - fluoxetine - up to 80mg/d ineffective  Previous psychiatric hospitalizations: denies   Previous suicide attempts: denies  Previous Self injurious behaviors:  denies  Previous therapy: weekly individual  History of violence: denies  Substances: denies Etoh, nicotine or recreational drugs     The patient's past medical, family and social history have been reviewed and updated as appropriate within the electronic medical record - see past Psychiatry encounter notes.     Current Medications:  Current Outpatient Medications on File Prior to Visit   Medication Sig Dispense Refill    acetaminophen (TYLENOL) 500 MG tablet Take by mouth.      buPROPion (WELLBUTRIN XL) 150 MG TB24 tablet Take 1 tablet (150 mg total) by mouth once daily. 30 tablet 3    FLUoxetine 40 MG capsule Take 2 capsules (80 mg total) by mouth once daily. 60 capsule 0    hydroCHLOROthiazide (HYDRODIURIL) 12.5 MG Tab TAKE 1 TABLET(12.5 MG) BY MOUTH EVERY DAY 90 tablet 2    semaglutide, weight " loss, (WEGOVY) 0.25 mg/0.5 mL PnIj Inject 0.25 mg into the skin every 7 days. 2 mL 0    sulfacetamide sodium 10% (BLEPH-10) 10 % ophthalmic solution Place 1 drop into the left eye 4 (four) times daily. 5 mL 0     No current facility-administered medications on file prior to visit.     Vitals:  BP Readings from Last 4 Encounters:   11/24/23 116/68   05/29/23 124/84   04/24/23 (!) 125/56   02/23/23 130/68         All Medical History/Surgical History/Family History/Social History/Allergies have been reviewed and updated in EMR.     Reviewed all current medications including OTC, herbals and supplements, including for interactions.    Allergies: Review of patient's allergies indicates:  No Known Allergies     Labs:       Lab Results   Component Value Date    WBC 5.29 11/24/2023    RBC 4.69 11/24/2023    HGB 12.7 11/24/2023    HCT 40.4 11/24/2023    MCV 86 11/24/2023    MCH 27.1 11/24/2023    MCHC 31.4 (L) 11/24/2023    RDW 14.6 (H) 11/24/2023     11/24/2023        Lab Results   Component Value Date     11/24/2023    K 3.6 11/24/2023     11/24/2023    CO2 28 11/24/2023    GLU 89 11/24/2023    BUN 13 11/24/2023    CREATININE 0.9 11/24/2023    CALCIUM 9.6 11/24/2023    PROT 7.5 11/24/2023    ALBUMIN 4.1 11/24/2023    BILITOT 0.6 11/24/2023    ALKPHOS 108 11/24/2023    AST 17 11/24/2023    ALT 13 11/24/2023    ANIONGAP 10 11/24/2023    EGFRNORACEVR >60.0 11/24/2023       Lab Results   Component Value Date    [id number] 21 (L) 04/29/2014        Patient of child-bearing age: post-menopausal      Mental Status Exam:  A: 58 y.o. female, appears documented age, calm and cooperative with visit, psychomotor activity neither increased nor decreased, speech regular rate/rhythm   M: apparent mood depressed with mood-congruent affect   S: A&O x 3, memory appears intact (though not formally tested)  I:  appears average intelligence  T: Thought content clear, logical and goal-directed, -AH/VH, -SI/HI, I/J appear  good     Assessment and Recommendations:  58 y.o. female with a psychiatric hx as noted above endorsing ongoing depression and anxiety sxs in the context of partial regimen adherence and several past medication trials. Discussed starting vortioxetine to target depression and anxiety as previously considered by Dr. Ng. Encouraged reconsidering IOP and likely need for combination tx and discussed difference between outpatient and more intensive treatment. No acute safety concerns to note, reviewed emergency procedures - 911, 988 or any emergency room. May benefit from reassessment of VitD levels in the future.    Discussed potential side effects of vortioxetine including sexual SEs, headache, and GI distress. Pt agreeable to this tentative plan, if approved.      Medication recommendations:  D/c bupropion - pt not taking  D/c fluoxetine now  Start vortioxetine 5mg/d x 1 week then 10mg/d    Other:  Consult with psychiatrist Dr. Blanchard on above recommendations and, if approved, medication ordering.  f/u with Dr. Velazquez for interim med mgmt. on 6/24/24 at 10am.  f/u with Dr. Ng as scheduled on 7/8/24.      Felipa Velazquez, PharmD, BCPP  Clinical Pharmacist - [facility]

## 2024-06-10 NOTE — PROGRESS NOTES
"[facility]  Pharmacy Note     VISIT DATE:  6/10/2024    The patient location is: [place] - pt is at home address on file  Visit type: Audiovisual     HPI: 58 y.o. female with a psychiatric hx of MDD, severe without psychotic features, RALPH, and social anxiety disorder and medical hx of HTN, obesity and fatigue seen for INTERIM medication management f/u. Last seen by usual psychiatric provider Dr. Ng Jan 2024 at which time plan made to:  Increase fluoxeetine to 80mg/d for depression and anxiety.   Restart bupropion XL 150mg      In the interim - she was increased to only fluoxetine 60mg, and accidentally started taking higher dose of 80mg on 5/21/24. Spoke with pt last week and given ongoing depression and reported tolerability, advised to continue higher dose for another week to allow for adequate trial    Adherence:  partial, not taking buproipion, doesn't know when she stopped  Side effects: denies     Today, pt describes moods as I just have this sadness I can't get rid off.....I feel stuck.... have this dark cloud over me." Pt describes feeling depressed daily, anhedonia, amotivation, low energy, irritability, feelings of guilt and passive SI. Feels med not helping. Also describes "Intrusive thoughts" that distract her easily. Also overeats. Denies sleep issues, "better now", was taking melatonin nightly and effective, but experienced next day sedation, now helps sleep onset by listening to music, denies issues with sleep maintenace. "I do what I have to do." Cares for her son with autism, but knows she could be doing more, spending more time with him. Not interested  in cooking. "I dont know how to socialize .... I feel awkward", wants to be able to socialize bekah with 's big family.  pushes her to attend [employer].  Was hiding depression from  for many years. Son did well on fluoxetine so was hopeful would work for her. Describes long standing depression, but "I was good " "at masking it" for many years. Worsened after menopause. Denies active si/hi. Cites caring for her son and her Mandaeism, feeling only "God can take me out", as strong protective factors. Describes stressors "out the wazoo"- daughter homeless x 2 years, blames her. Guilt - feels she may have waited too late to have son, blames herself for his autism, blames herself for her kids' conditions and that daughters might have depression too. Looked into [facility], but deductible issues stopped her from proceeding. Also voices low confidence in therapy effectiveness.   Mom with hx bipolar and psychosis.     Never started Wegovy for obesity d/t shortage.     Past Psychiatric History:   Previous medications:   - zolpidem - daytime grogginess  - Sertraline - ineffective at 100 mg  - Effexor- effective initially for 2 days a week, with more time ineffective even at highest dose, +sexual SEs  - Bupropion - ineffective at 150mg or 300mg, started for sexual SEs 2/2 venlafaxine  - fluoxetine - up to 80mg/d ineffective  Previous psychiatric hospitalizations: denies   Previous suicide attempts: denies  Previous Self injurious behaviors:  denies  Previous therapy: weekly individual  History of violence: denies  Substances: denies Etoh, nicotine or recreational drugs     The patient's past medical, family and social history have been reviewed and updated as appropriate within the electronic medical record - see past Psychiatry encounter notes.     Current Medications:  Current Outpatient Medications on File Prior to Visit   Medication Sig Dispense Refill    acetaminophen (TYLENOL) 500 MG tablet Take by mouth.      buPROPion (WELLBUTRIN XL) 150 MG TB24 tablet Take 1 tablet (150 mg total) by mouth once daily. 30 tablet 3    FLUoxetine 40 MG capsule Take 2 capsules (80 mg total) by mouth once daily. 60 capsule 0    hydroCHLOROthiazide (HYDRODIURIL) 12.5 MG Tab TAKE 1 TABLET(12.5 MG) BY MOUTH EVERY DAY 90 tablet 2    semaglutide, weight " loss, (WEGOVY) 0.25 mg/0.5 mL PnIj Inject 0.25 mg into the skin every 7 days. 2 mL 0    sulfacetamide sodium 10% (BLEPH-10) 10 % ophthalmic solution Place 1 drop into the left eye 4 (four) times daily. 5 mL 0     No current facility-administered medications on file prior to visit.     Vitals:  BP Readings from Last 4 Encounters:   11/24/23 116/68   05/29/23 124/84   04/24/23 (!) 125/56   02/23/23 130/68         All Medical History/Surgical History/Family History/Social History/Allergies have been reviewed and updated in EMR.     Reviewed all current medications including OTC, herbals and supplements, including for interactions.    Allergies: Review of patient's allergies indicates:  No Known Allergies     Labs:       Lab Results   Component Value Date    WBC 5.29 11/24/2023    RBC 4.69 11/24/2023    HGB 12.7 11/24/2023    HCT 40.4 11/24/2023    MCV 86 11/24/2023    MCH 27.1 11/24/2023    MCHC 31.4 (L) 11/24/2023    RDW 14.6 (H) 11/24/2023     11/24/2023        Lab Results   Component Value Date     11/24/2023    K 3.6 11/24/2023     11/24/2023    CO2 28 11/24/2023    GLU 89 11/24/2023    BUN 13 11/24/2023    CREATININE 0.9 11/24/2023    CALCIUM 9.6 11/24/2023    PROT 7.5 11/24/2023    ALBUMIN 4.1 11/24/2023    BILITOT 0.6 11/24/2023    ALKPHOS 108 11/24/2023    AST 17 11/24/2023    ALT 13 11/24/2023    ANIONGAP 10 11/24/2023    EGFRNORACEVR >60.0 11/24/2023       Lab Results   Component Value Date    [id number] 21 (L) 04/29/2014        Patient of child-bearing age: post-menopausal      Mental Status Exam:  A: 58 y.o. female, appears documented age, calm and cooperative with visit, psychomotor activity neither increased nor decreased, speech regular rate/rhythm   M: apparent mood depressed with mood-congruent affect   S: A&O x 3, memory appears intact (though not formally tested)  I:  appears average intelligence  T: Thought content clear, logical and goal-directed, -AH/VH, -SI/HI, I/J appear  good     Assessment and Recommendations:  58 y.o. female with a psychiatric hx as noted above endorsing ongoing depression and anxiety sxs in the context of partial regimen adherence and several past medication trials. Discussed starting vortioxetine to target depression and anxiety as previously considered by Dr. Ng. Encouraged reconsidering IOP and likely need for combination tx and discussed difference between outpatient and more intensive treatment. No acute safety concerns to note, reviewed emergency procedures - 911, 988 or any emergency room. May benefit from reassessment of VitD levels in the future.    Discussed potential side effects of vortioxetine including sexual SEs, headache, and GI distress. Pt agreeable to this tentative plan, if approved.      Medication recommendations:  D/c bupropion - pt not taking  D/c fluoxetine now  Start vortioxetine 5mg/d x 1 week then 10mg/d    Other:  Consult with psychiatrist Dr. Blanchard on above recommendations and, if approved, medication ordering.  f/u with Dr. Velazquez for interim med mgmt. on 6/24/24 at 10am.  f/u with Dr. Ng as scheduled on 7/8/24.      Felipa Velazquez, PharmD, BCPP  Clinical Pharmacist - [facility]

## 2024-06-12 DIAGNOSIS — F33.2 SEVERE EPISODE OF RECURRENT MAJOR DEPRESSIVE DISORDER, WITHOUT PSYCHOTIC FEATURES: ICD-10-CM

## 2024-06-12 DIAGNOSIS — F40.10 SOCIAL ANXIETY DISORDER: ICD-10-CM

## 2024-06-12 DIAGNOSIS — F41.1 GENERALIZED ANXIETY DISORDER: ICD-10-CM

## 2024-06-17 NOTE — TELEPHONE ENCOUNTER
Care Due:                  Date            Visit Type   Department     Provider  --------------------------------------------------------------------------------                                MYCHART                              FOLLOWUP/OF  [facility] INTERNAL  Last Visit: 11-      FICE VISIT   MEDICINE       Yuly Bowen  Next Visit: None Scheduled  None         None Found                                                            Last  Test          Frequency    Reason                     Performed    Due Date  --------------------------------------------------------------------------------    HBA1C.......  6 months...  semaglutide,.............  11- 05-    [facility] Embedded Care Due Messages. Reference number: [id number].   6/17/2024 6:13:06 PM CDT

## 2024-06-18 RX ORDER — HYDROCHLOROTHIAZIDE 12.5 MG/1
12.5 TABLET ORAL
Qty: 90 TABLET | Refills: 1 | Status: SHIPPED | OUTPATIENT
Start: 2024-06-18

## 2024-06-18 NOTE — TELEPHONE ENCOUNTER
Provider Staff:  Action required for this patient    Requires labs      Please see care gap opportunities below in Care Due Message.    Thanks!  [facility]     Appointments      Date Provider   Last Visit   11/24/2023 Yuly Bowen MD   Next Visit   Visit date not found Yuly Bowen MD     Refill Decision Note   Phoeneshia Jacques  is requesting a refill authorization.  Brief Assessment and Rationale for Refill:  Approve     Medication Therapy Plan:         Comments:     Note composed:2:59 AM 06/18/2024

## 2024-06-24 ENCOUNTER — DOCUMENTATION ONLY (OUTPATIENT)
Dept: PSYCHIATRY | Facility: CLINIC | Age: 59
End: 2024-06-24

## 2024-06-24 NOTE — PROGRESS NOTES
"[facility]  Pharmacy Note     VISIT DATE:  6/24/2024    The patient location is: [place] - pt is at home address on file  Visit type: Audiovisual     HPI: 58 y.o. female with a psychiatric hx of MDD, severe without psychotic features, RALPH, and social anxiety disorder and medical hx of HTN, obesity and fatigue seen for INTERIM medication management f/u. Last seen by this pharmacist on 6/10/24 at which time plan made to:  Medication recommendations:  D/c bupropion - pt not taking  D/c fluoxetine now  Start vortioxetine 5mg/d x 1 week then 10mg/d     Other:  Consult with psychiatrist Dr. Blanchard on above recommendations and, if approved, medication ordering.  f/u with Dr. Velazquez for interim med mgmt. on 6/24/24 at 10am.  f/u with Dr. Ng as scheduled on 7/8/24.     Adherence:  endorses  Side effects: initial mild GI distress and HA but improving with time     Today, pt describes moods as " not much better." Continues with other depressive sxs described last visit. Started 5mg on 6/14 and 10mg 6/21(3 days ago). Denies noting any improvements in depressive sxs so far but pt aware can take more time. Cost is also a little burdensome, $70 with her insurance so definitely doesn't want to continue med much longer if no response at all. Says she wants to try non-med options if med doesn't work. Denies any active or passive SI.    Past Psychiatric History:   Previous medications:   - zolpidem - daytime grogginess  - Sertraline - ineffective at 100 mg  - Effexor- effective initially for 2 days a week, with more time ineffective even at highest dose, +sexual SEs  - Bupropion - ineffective at 150mg or 300mg, started for sexual SEs 2/2 venlafaxine  - fluoxetine - up to 80mg/d ineffective  Previous psychiatric hospitalizations: denies   Previous suicide attempts: denies  Previous Self injurious behaviors:  denies  Previous therapy: weekly individual  History of violence: denies  Substances: denies Etoh, " nicotine or recreational drugs     The patient's past medical, family and social history have been reviewed and updated as appropriate within the electronic medical record - see past Psychiatry encounter notes.     Current Medications:  Current Outpatient Medications on File Prior to Visit   Medication Sig Dispense Refill    acetaminophen (TYLENOL) 500 MG tablet Take by mouth.      hydroCHLOROthiazide (HYDRODIURIL) 12.5 MG Tab TAKE 1 TABLET(12.5 MG) BY MOUTH EVERY DAY 90 tablet 1    semaglutide, weight loss, (WEGOVY) 0.25 mg/0.5 mL PnIj Inject 0.25 mg into the skin every 7 days. 2 mL 0    sulfacetamide sodium 10% (BLEPH-10) 10 % ophthalmic solution Place 1 drop into the left eye 4 (four) times daily. 5 mL 0    vortioxetine (TRINTELLIX) 10 mg Tab Take 1 tablet (10 mg total) by mouth once daily. 30 tablet 0     No current facility-administered medications on file prior to visit.       Vitals:  BP Readings from Last 4 Encounters:   11/24/23 116/68   05/29/23 124/84   04/24/23 (!) 125/56   02/23/23 130/68         All Medical History/Surgical History/Family History/Social History/Allergies have been reviewed and updated in EMR.     Reviewed all current medications including OTC, herbals and supplements, including for interactions.    Allergies: Review of patient's allergies indicates:  No Known Allergies     Labs:       Lab Results   Component Value Date    WBC 5.29 11/24/2023    RBC 4.69 11/24/2023    HGB 12.7 11/24/2023    HCT 40.4 11/24/2023    MCV 86 11/24/2023    MCH 27.1 11/24/2023    MCHC 31.4 (L) 11/24/2023    RDW 14.6 (H) 11/24/2023     11/24/2023        Lab Results   Component Value Date     11/24/2023    K 3.6 11/24/2023     11/24/2023    CO2 28 11/24/2023    GLU 89 11/24/2023    BUN 13 11/24/2023    CREATININE 0.9 11/24/2023    CALCIUM 9.6 11/24/2023    PROT 7.5 11/24/2023    ALBUMIN 4.1 11/24/2023    BILITOT 0.6 11/24/2023    ALKPHOS 108 11/24/2023    AST 17 11/24/2023    ALT 13 11/24/2023     ANIONGAP 10 11/24/2023    EGFRNORACEVR >60.0 11/24/2023       Lab Results   Component Value Date    [id number] 21 (L) 04/29/2014        Patient of child-bearing age: no      Mental Status Exam:  A: 58 y.o. female, appears documented age, calm and cooperative with visit, psychomotor activity neither increased nor decreased, speech regular rate/rhythm   M: apparent mood depressed with mood-congruent affect   S: A&O x 3, memory appears intact (though not formally tested)  I:  appears average intelligence  T: Thought content clear, logical and goal-directed, -AH/VH, -SI/HI, I/J appear good     Assessment and Recommendations:  58 y.o. female with a psychiatric hx as noted above endorsing fair tolerability with switch to Vortioxetine and no initial response in the context of medication adherence and long standing depression. No acute safety concerns to note. May benefit from intensive outpt program as she's previously d/w Dr. Ng however cost has been an issue.     Discussed tentative plan to continue titrating up vortioxetine dose given no initial response in any depressive sxs, though pt aware improved mood can take up t 4-6 weeks. Discussed potential side effects including GI distress, HA, and sexual SEs. Pt agreeable to this tentative plan, if approved.       Medication recommendations:  Cont vortioxetine 10mg until 6/28, then increase to 20mg/d    Other:  Consult with psychiatrist Dr. Blanchard on above recommendations and, if approved, medication ordering.  f/u with Dr. Ng as scheduled on 7/8/24.      Felipa Velazquez, PharmD, BCPP  Clinical Pharmacist - [facility] Amb[facility]

## 2024-07-08 ENCOUNTER — OFFICE VISIT (OUTPATIENT)
Dept: PSYCHIATRY | Facility: CLINIC | Age: 59
End: 2024-07-08

## 2024-07-08 VITALS
SYSTOLIC BLOOD PRESSURE: 112 MMHG | HEART RATE: 80 BPM | BODY MASS INDEX: 29.84 KG/M2 | WEIGHT: 179.38 LBS | DIASTOLIC BLOOD PRESSURE: 59 MMHG

## 2024-07-08 DIAGNOSIS — F33.2 SEVERE EPISODE OF RECURRENT MAJOR DEPRESSIVE DISORDER, WITHOUT PSYCHOTIC FEATURES: Primary | ICD-10-CM

## 2024-07-08 DIAGNOSIS — R53.83 FATIGUE, UNSPECIFIED TYPE: ICD-10-CM

## 2024-07-08 DIAGNOSIS — M79.10 MYALGIA: ICD-10-CM

## 2024-07-08 DIAGNOSIS — F40.10 SOCIAL ANXIETY DISORDER: ICD-10-CM

## 2024-07-08 DIAGNOSIS — F41.1 GENERALIZED ANXIETY DISORDER: ICD-10-CM

## 2024-07-08 PROCEDURE — 99999 PR PBB SHADOW E&M-EST. PATIENT-LVL III: CPT | Mod: PBBFAC,,, | Performed by: NURSE PRACTITIONER

## 2024-07-08 PROCEDURE — 99213 OFFICE O/P EST LOW 20 MIN: CPT | Mod: PBBFAC | Performed by: NURSE PRACTITIONER

## 2024-07-08 PROCEDURE — 99214 OFFICE O/P EST MOD 30 MIN: CPT | Mod: S$PBB,,, | Performed by: NURSE PRACTITIONER

## 2024-07-08 RX ORDER — VORTIOXETINE 20 MG/1
20 TABLET, FILM COATED ORAL DAILY
Qty: 30 TABLET | Refills: 3 | Status: SHIPPED | OUTPATIENT
Start: 2024-07-08
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
## 2024-07-10 ENCOUNTER — PATIENT MESSAGE (OUTPATIENT)
Dept: PSYCHIATRY | Facility: CLINIC | Age: 59
End: 2024-07-10

## 2024-07-19 ENCOUNTER — PATIENT MESSAGE (OUTPATIENT)
Dept: PSYCHIATRY | Facility: CLINIC | Age: 59
End: 2024-07-19

## 2024-08-14 ENCOUNTER — TELEPHONE (OUTPATIENT)
Dept: OBSTETRICS AND GYNECOLOGY | Facility: CLINIC | Age: 59
End: 2024-08-14
Payer: COMMERCIAL

## 2024-08-14 NOTE — TELEPHONE ENCOUNTER
----- Message from Kristen Jennings sent at 8/14/2024  1:19 PM CDT -----  Regarding: Sooner appt  Type: Patient Call Back    Who called:    What is the request in detail: p/t is calling because she is experiencing bad pain in her uterus and wants to be seen asap. Please call to assist, she is not picky on location.     Can the clinic reply by [facility]?    Would the patient rather a call back or a response via [facility]?     Best call back number: [phone] (home)       Additional Information:

## 2024-08-15 ENCOUNTER — TELEPHONE (OUTPATIENT)
Dept: OBSTETRICS AND GYNECOLOGY | Facility: CLINIC | Age: 59
End: 2024-08-15
Payer: COMMERCIAL

## 2024-08-15 ENCOUNTER — NURSE TRIAGE (OUTPATIENT)
Dept: ADMINISTRATIVE | Facility: CLINIC | Age: 59
End: 2024-08-15
Payer: COMMERCIAL

## 2024-08-15 NOTE — TELEPHONE ENCOUNTER
"Returned call to pt - still endorses severe chest pain radiating to shoulder. Reiterated need to go to nearest ER now or call 911 if unable to transport herself to ER. Pt stated she will go to  for eval, but strongly recommended ER visit since  will send her to ER with symptoms as well. Pt still denied ED visit, but did state that "if symptoms worsen she will consider ER". Pt requested a follow up visit with Dr Bowen, which I did schedule for soonest opening with dispo to still be seen in ED.  "

## 2024-08-15 NOTE — TELEPHONE ENCOUNTER
----- Message from Brook Alvarez sent at 8/15/2024 10:27 AM CDT -----  Regarding: missed call       Who Called: Phoeneshia         Who Left Message for Patient: Caprice         Does the patient know what this is regarding? Yes         Best Call Back Number: [phone]         Additional Information:
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
## 2024-08-20 NOTE — PROGRESS NOTES
HISTORY OF PRESENT ILLNESS:    Phoeneshia L Jacques is a 58 y.o. female, , No LMP recorded. Patient is perimenopausal.,  presents with c/o labial itching/irritation for 2-3 weeks, reports taking warm baths for comfort. Pt also reports intermittent left sided pelvic pain. Pt rates pain 4-5, does not have to take pain meds. Pt reports no pain today. Pt also reports painful intercourse with penetration and deep penetration. Patient's PHQ-9 score: 15. Pt denies SI/HI.     Past Medical History:   Diagnosis Date    Anxiety     Arthritis     Asthma     Depression     Hx of psychiatric care     Hypertension     Psychiatric problem     Sleep difficulties     Therapy      Past Surgical History:   Procedure Laterality Date    BREAST BIOPSY      essure      TOTAL HIP ARTHROPLASTY Left 2019    TUBAL LIGATION       MEDICATIONS AND ALLERGIES:    Current Outpatient Medications:     TRINTELLIX 5 mg Tab, Take 1 tablet by mouth., Disp: , Rfl:     fluconazole (DIFLUCAN) 150 MG Tab, Take 1 tablet (150 mg total) by mouth every 72 hours as needed (vaginal itching/discharge)., Disp: 2 tablet, Rfl: 0    hydroCHLOROthiazide (HYDRODIURIL) 12.5 MG Tab, TAKE 1 TABLET(12.5 MG) BY MOUTH EVERY DAY, Disp: 90 tablet, Rfl: 1    nystatin (MYCOSTATIN) powder, Apply to affected area 3 times daily, Disp: 30 g, Rfl: 1    semaglutide, weight loss, (WEGOVY) 0.25 mg/0.5 mL PnIj, Inject 0.25 mg into the skin every 7 days., Disp: 2 mL, Rfl: 0    vortioxetine (TRINTELLIX) 20 mg Tab, Take 1 tablet (20 mg total) by mouth once daily., Disp: 30 tablet, Rfl: 3    Review of patient's allergies indicates:  No Known Allergies    Family History   Problem Relation Name Age of Onset    Depression Mother      Bipolar disorder Mother      Schizophrenia Mother      Heart disease Mother      Hypertension Mother      Cancer Paternal Aunt      Bipolar disorder Brother      Schizophrenia Brother      Learning disabilities Paternal Uncle      Learning disabilities  Son       Social History     Socioeconomic History    Marital status:      Spouse name: Guillermo    Number of children: 4   Occupational History     Employer:  NOT EMPLOYED   Tobacco Use    Smoking status: Never    Smokeless tobacco: Never   Substance and Sexual Activity    Alcohol use: No     Alcohol/week: 0.0 standard drinks of alcohol    Drug use: No    Sexual activity: Yes     Partners: Male     Social Determinants of Health     Financial Resource Strain: Patient Declined (1/9/2024)    Overall Financial Resource Strain (CARDIA)     Difficulty of Paying Living Expenses: Patient declined   Food Insecurity: Patient Declined (1/9/2024)    Hunger Vital Sign     Worried About Running Out of Food in the Last Year: Patient declined     Ran Out of Food in the Last Year: Patient declined   Transportation Needs: No Transportation Needs (1/9/2024)    PRAPARE - Transportation     Lack of Transportation (Medical): No     Lack of Transportation (Non-Medical): No   Physical Activity: Inactive (1/9/2024)    Exercise Vital Sign     Days of Exercise per Week: 0 days     Minutes of Exercise per Session: 0 min   Stress: Stress Concern Present (1/9/2024)    [employer] - Occupational Stress Questionnaire     Feeling of Stress : Very much   Housing Stability: Patient Declined (1/9/2024)    Housing Stability Vital Sign     Unable to Pay for Housing in the Last Year: Patient declined     Unstable Housing in the Last Year: Patient declined     ROS:  GENERAL: No weight changes. No swelling. No fatigue. No fever.  BREASTS: No pain. No lumps. No discharge.  ABDOMEN: No pain. No nausea. No vomiting. No diarrhea. No constipation.  REPRODUCTIVE: No abnormal bleeding. Reports pelvic pain; dyspareunia  VULVA: No pain. No lesions. Reports labial itching/irritation  VAGINA: No relaxation. No itching. No odor. No discharge. No lesions.  URINARY: No incontinence. No nocturia. No frequency. No dysuria.    /77    "Ht 5' 5" (1.651 m)   Wt 84.4 kg (186 lb 1.1 oz)   BMI 30.96 kg/m²     PE:  APPEARANCE: Well nourished, well developed, in no acute distress.  AFFECT: WNL, alert and oriented x 3.  SKIN: Warm and dry  PELVIC: External female genitalia with rash like appearance/redness noted to pantiliner area without lesions.  Female hair distribution. Adequate perineal body, Normal urethral meatus. Vaginal atrophy noted without lesions or discharge.  No significant cystocele or rectocele present. Cervix pink without lesions, discharge or tenderness. Uterus is normal in size, mobile and nontender. Adnexa without masses or tenderness.  EXTREMITIES: No edema    DIAGNOSIS:  1. Labial irritation    2. Pelvic pain    3. Dyspareunia in female      PLAN:  -Affirm swab performed today  -Will send diflucan and nystatin powder to pharmacy; advised pt to keep area dry, open to air when possible  -Pelvic pain: sent order for pelvic U/S; discussed comfort measures- ibuprofen, warm baths, heating pad, rest  -Dyspareunia: discussed pelvic floor PT (pt declines at this time); discussed use of water based lubricants, change in position  Orders Placed This Encounter    US Pelvis Comp with Transvag NON-OB (xpd    Vaginosis Screen by DNA Probe    fluconazole (DIFLUCAN) 150 MG Tab    nystatin (MYCOSTATIN) powder     COUNSELING:  Patient counseled on vaginitis prevention including :  a. avoiding feminine products such as deoderant soaps, body wash, bubble bath, douches, scented toilet paper, deoderant tampons or pads, feminine wipes, chronic pad use, etc.  b. avoiding other vulvovaginal irritants such as long hot baths, humidity, tight, synthetic clothing, chlorine and sitting around in wet bathing suits  c. wearing cotton underwear, avoiding thong underwear and no underwear to bed  d. taking showers instead of baths and use a hair dryer on cool setting afterwards to dry  e. wearing cotton to exercise and shower immediately after exercise and change " clothes  f. using polyurethane condoms without spermicide if sexually active and symptoms are triggered by intercourse     FOLLOW-UP with me as needed or if symptoms persist or worsen    Kolton Posada, DNP, RN, APRN, WHNP-BC  [facility]- [facility]

## 2024-08-21 ENCOUNTER — OFFICE VISIT (OUTPATIENT)
Dept: OBSTETRICS AND GYNECOLOGY | Facility: CLINIC | Age: 59
End: 2024-08-21
Payer: COMMERCIAL

## 2024-08-21 VITALS
SYSTOLIC BLOOD PRESSURE: 114 MMHG | WEIGHT: 186.06 LBS | DIASTOLIC BLOOD PRESSURE: 77 MMHG | BODY MASS INDEX: 31 KG/M2 | HEIGHT: 65 IN

## 2024-08-21 DIAGNOSIS — N94.10 DYSPAREUNIA IN FEMALE: ICD-10-CM

## 2024-08-21 DIAGNOSIS — N90.89 LABIAL IRRITATION: Primary | ICD-10-CM

## 2024-08-21 DIAGNOSIS — R10.2 PELVIC PAIN: ICD-10-CM

## 2024-08-21 PROCEDURE — 3078F DIAST BP <80 MM HG: CPT | Mod: CPTII,S$GLB,, | Performed by: NURSE PRACTITIONER

## 2024-08-21 PROCEDURE — 99214 OFFICE O/P EST MOD 30 MIN: CPT | Mod: S$GLB,,, | Performed by: NURSE PRACTITIONER

## 2024-08-21 PROCEDURE — 1160F RVW MEDS BY RX/DR IN RCRD: CPT | Mod: CPTII,S$GLB,, | Performed by: NURSE PRACTITIONER

## 2024-08-21 PROCEDURE — 1159F MED LIST DOCD IN RCRD: CPT | Mod: CPTII,S$GLB,, | Performed by: NURSE PRACTITIONER

## 2024-08-21 PROCEDURE — 3074F SYST BP LT 130 MM HG: CPT | Mod: CPTII,S$GLB,, | Performed by: NURSE PRACTITIONER

## 2024-08-21 PROCEDURE — 99999 PR PBB SHADOW E&M-EST. PATIENT-LVL III: CPT | Mod: PBBFAC,,, | Performed by: NURSE PRACTITIONER

## 2024-08-21 PROCEDURE — 3008F BODY MASS INDEX DOCD: CPT | Mod: CPTII,S$GLB,, | Performed by: NURSE PRACTITIONER

## 2024-08-21 RX ORDER — VORTIOXETINE 5 MG/1
1 TABLET, FILM COATED ORAL
COMMUNITY
Start: 2024-06-12

## 2024-08-21 RX ORDER — NYSTATIN 100000 [USP'U]/G
POWDER TOPICAL
Qty: 30 G | Refills: 1 | Status: SHIPPED | OUTPATIENT
Start: 2024-08-21 | End: 2025-08-21

## 2024-08-21 RX ORDER — FLUCONAZOLE 150 MG/1
150 TABLET ORAL
Qty: 2 TABLET | Refills: 0 | Status: SHIPPED | OUTPATIENT
Start: 2024-08-21

## 2024-09-17 ENCOUNTER — OFFICE VISIT (OUTPATIENT)
Dept: INTERNAL MEDICINE | Facility: CLINIC | Age: 59
End: 2024-09-17
Payer: COMMERCIAL

## 2024-09-17 VITALS
DIASTOLIC BLOOD PRESSURE: 58 MMHG | SYSTOLIC BLOOD PRESSURE: 100 MMHG | TEMPERATURE: 98 F | HEIGHT: 64 IN | HEART RATE: 69 BPM | BODY MASS INDEX: 32.37 KG/M2 | OXYGEN SATURATION: 99 % | WEIGHT: 189.63 LBS

## 2024-09-17 DIAGNOSIS — G44.211 INTRACTABLE EPISODIC TENSION-TYPE HEADACHE: ICD-10-CM

## 2024-09-17 DIAGNOSIS — I10 ESSENTIAL HYPERTENSION: ICD-10-CM

## 2024-09-17 DIAGNOSIS — F41.8 DEPRESSION WITH ANXIETY: ICD-10-CM

## 2024-09-17 DIAGNOSIS — R07.9 CHEST PAIN, UNSPECIFIED TYPE: Primary | ICD-10-CM

## 2024-09-17 DIAGNOSIS — K30 INDIGESTION: ICD-10-CM

## 2024-09-17 DIAGNOSIS — M54.6 THORACIC BACK PAIN, UNSPECIFIED BACK PAIN LATERALITY, UNSPECIFIED CHRONICITY: ICD-10-CM

## 2024-09-17 DIAGNOSIS — E78.2 MIXED HYPERLIPIDEMIA: ICD-10-CM

## 2024-09-17 LAB
OHS QRS DURATION: 88 MS
OHS QTC CALCULATION: 426 MS

## 2024-09-17 PROCEDURE — 93010 ELECTROCARDIOGRAM REPORT: CPT | Mod: S$GLB,,, | Performed by: INTERNAL MEDICINE

## 2024-09-17 PROCEDURE — 99999 PR PBB SHADOW E&M-EST. PATIENT-LVL V: CPT | Mod: PBBFAC,,,

## 2024-09-17 RX ORDER — ROSUVASTATIN CALCIUM 40 MG/1
40 TABLET, COATED ORAL NIGHTLY
Qty: 30 TABLET | Refills: 6 | Status: SHIPPED | OUTPATIENT
Start: 2024-09-17

## 2024-09-17 RX ORDER — CALCIUM CARBONATE 400(1000)
1-4 TABLET,CHEWABLE ORAL DAILY PRN
Qty: 30 TABLET | Refills: 3 | Status: SHIPPED | OUTPATIENT
Start: 2024-09-17 | End: 2025-09-17

## 2024-09-17 RX ORDER — IBUPROFEN 600 MG/1
600 TABLET ORAL 3 TIMES DAILY
Qty: 30 TABLET | Refills: 3 | Status: SHIPPED | OUTPATIENT
Start: 2024-09-17

## 2024-09-17 RX ORDER — METOPROLOL TARTRATE 25 MG/1
12.5 TABLET, FILM COATED ORAL 2 TIMES DAILY
Qty: 90 TABLET | Refills: 3 | Status: SHIPPED | OUTPATIENT
Start: 2024-09-17 | End: 2025-09-17

## 2024-09-17 RX ORDER — TIZANIDINE 2 MG/1
4 TABLET ORAL EVERY 8 HOURS PRN
Qty: 60 TABLET | Refills: 0 | Status: SHIPPED | OUTPATIENT
Start: 2024-09-17 | End: 2024-09-27

## 2024-09-17 NOTE — PROGRESS NOTES
Subjective:       Patient ID: Phoeneshia L Jacques is a 58 y.o. female.    Chief Complaint: Headache, Chest Pain, and Back Pain      History of Present Illness    CHIEF COMPLAINT:  Ms. Kirby presents today for headache, chest pain, and back pain.    CHEST PAIN AND INDIGESTION:  She reports experiencing chest pain multiple times, with the most recent episode occurring approximately two weeks ago. The pain is described as heartburn or food stuck in her esophagus, localized in the middle of her chest with a severity of 9/10. Associated symptoms include nausea and difficulty standing up straight. Relief is obtained by drinking Sprite and burping. Similar episodes occurred a few months ago. She denies shortness of breath or diaphoresis during these episodes. The pain is accompanied by a burning sensation and fullness in the middle of her chest. She denies radiation of pain. Pepto-Bismol is occasionally used for symptom relief, though it caused vomiting during the most recent episode. She denies experiencing these symptoms daily or weekly and does not take any daily antacids or other medications for indigestion.    BACK PAIN:  She reports back pain that started on Friday, localized to the middle back between the shoulder blades. She denies any known injury or heavy lifting that may have precipitated the pain. The pain has improved slightly since onset but is still present. Denies fever, saddle anesthesia, fever, trauma, or changes in sensation.     HEADACHES:  She experiences headaches associated with wearing headbands, with a history of tension headaches. The headaches are relieved by removing the headband and treated effectively with Tylenol. She denies visual disturbances, nausea, vomiting, or light sensitivity with the headaches.    MEDICATIONS:  She takes hydrochlorothiazide 12.5 mg for blood pressure control.    ALLERGIES:  She denies any known allergies.      ROS:  Head: +headaches  Respiratory: -shortness of  breath  Cardiovascular: +chest pain  Gastrointestinal: -nausea, -vomiting, +heartburn  Musculoskeletal: +back pain  Allergic: -allergic reactions          HPI  Review of Systems   Constitutional:  Negative for activity change and unexpected weight change.   HENT:  Negative for hearing loss, rhinorrhea and trouble swallowing.    Eyes:  Negative for discharge and visual disturbance.   Respiratory:  Positive for chest tightness. Negative for wheezing.    Cardiovascular:  Positive for chest pain. Negative for palpitations.   Gastrointestinal:  Negative for blood in stool, constipation, diarrhea and vomiting.   Endocrine: Negative for polydipsia and polyuria.   Genitourinary:  Negative for difficulty urinating, dysuria, hematuria and menstrual problem.   Musculoskeletal:  Positive for arthralgias and neck pain. Negative for joint swelling.   Neurological:  Positive for headaches. Negative for weakness.   Psychiatric/Behavioral:  Positive for dysphoric mood. Negative for confusion.          Objective:      Physical Exam    Assessment:       1. Chest pain, unspecified type    2. Indigestion    3. Intractable episodic tension-type headache    4. Thoracic back pain, unspecified back pain laterality, unspecified chronicity    5. Depression with anxiety    6. Essential hypertension    7. Mixed hyperlipidemia      Plan:     Assessment & Plan    CHEST PAIN:  - EKG ordered to evaluate heart function and rule out cardiac issues.  -Referral for exercise stress test  -Start Crestor 40 mg daily  -Start metoprolol 12.5 mg b.i.d.  -Referral for cardiology placed.   -Strict ER precautions provided.     HEADACHES: Chronic  - Explained that headbands can induce headaches in some individuals due to pressure.  - MsVikash Kirby to avoid wearing restrictive headbands or consider not wearing them at all if they cause headaches.  - Recommend staying well-hydrated and ensuring adequate rest to prevent headaches.  - Contact the office if  experiencing headaches that cannot be adequately treated at home, seem different from usual headaches, or are accompanied by debilitating symptoms.    GERD (GASTROESOPHAGEAL REFLUX DISEASE):  - Provided information on GERD and its potential triggers.  - Discussed alarm symptoms for GERD, including weight loss, sore throat, difficulty swallowing, severe abdominal pain, coughing up blood, or passing dark red stools.  - Ms. Kirby to remain upright for at least 2 hours after eating to prevent GERD symptoms.  - Recommend avoiding lying down immediately after eating.  - Ms. Kirby to be mindful of fried, fatty, heavy, or spicy foods that can induce indigestion.    INDIGESTION:   - Started Tums for occasional indigestion symptoms.    HYPERTENSION: Chronic  - Continued hydrochlorothiazide 12.5 mg for blood pressure control.    CARDIAC EVALUATION:  - EKG ordered to evaluate heart function and rule out cardiac issues.            Jameson Crane,MSN, APRN, FNP-C  [facility]  9:16 AM

## 2024-09-24 ENCOUNTER — HOSPITAL ENCOUNTER (OUTPATIENT)
Dept: CARDIOLOGY | Facility: OTHER | Age: 59
Discharge: HOME | End: 2024-09-24
Payer: COMMERCIAL

## 2024-09-24 VITALS
WEIGHT: 189 LBS | DIASTOLIC BLOOD PRESSURE: 80 MMHG | HEART RATE: 62 BPM | SYSTOLIC BLOOD PRESSURE: 127 MMHG | HEIGHT: 64 IN | BODY MASS INDEX: 32.27 KG/M2

## 2024-09-24 DIAGNOSIS — R07.9 CHEST PAIN, UNSPECIFIED TYPE: ICD-10-CM

## 2024-09-24 LAB
CV STRESS BASE HR: 62 BPM
DIASTOLIC BLOOD PRESSURE: 80 MMHG
OHS CV CPX 1 MINUTE RECOVERY HEART RATE: 112 BPM
OHS CV CPX 85 PERCENT MAX PREDICTED HEART RATE MALE: 139
OHS CV CPX ESTIMATED METS: 7
OHS CV CPX MAX PREDICTED HEART RATE: 164
OHS CV CPX PATIENT IS FEMALE: 1
OHS CV CPX PATIENT IS MALE: 0
OHS CV CPX PEAK DIASTOLIC BLOOD PRESSURE: 74 MMHG
OHS CV CPX PEAK HEAR RATE: 134 BPM
OHS CV CPX PEAK RATE PRESSURE PRODUCT: NORMAL
OHS CV CPX PEAK SYSTOLIC BLOOD PRESSURE: 179 MMHG
OHS CV CPX PERCENT MAX PREDICTED HEART RATE ACHIEVED: 86
OHS CV CPX RATE PRESSURE PRODUCT PRESENTING: 7874
STRESS ECHO POST EXERCISE DUR MIN: 4 MINUTES
STRESS ECHO POST EXERCISE DUR SEC: 11 SECONDS
SYSTOLIC BLOOD PRESSURE: 127 MMHG

## 2024-09-24 PROCEDURE — 93016 CV STRESS TEST SUPVJ ONLY: CPT | Mod: ,,, | Performed by: INTERNAL MEDICINE

## 2024-09-24 PROCEDURE — 93017 CV STRESS TEST TRACING ONLY: CPT

## 2024-09-24 PROCEDURE — 93018 CV STRESS TEST I&R ONLY: CPT | Mod: ,,, | Performed by: INTERNAL MEDICINE

## 2024-10-28 ENCOUNTER — OFFICE VISIT (OUTPATIENT)
Dept: INTERNAL MEDICINE | Facility: CLINIC | Age: 59
End: 2024-10-28
Payer: COMMERCIAL

## 2024-10-28 ENCOUNTER — PATIENT MESSAGE (OUTPATIENT)
Dept: INTERNAL MEDICINE | Facility: CLINIC | Age: 59
End: 2024-10-28

## 2024-10-28 VITALS
TEMPERATURE: 98 F | WEIGHT: 191.94 LBS | HEART RATE: 65 BPM | OXYGEN SATURATION: 100 % | BODY MASS INDEX: 32.77 KG/M2 | SYSTOLIC BLOOD PRESSURE: 110 MMHG | HEIGHT: 64 IN | DIASTOLIC BLOOD PRESSURE: 70 MMHG

## 2024-10-28 DIAGNOSIS — F41.8 DEPRESSION WITH ANXIETY: ICD-10-CM

## 2024-10-28 DIAGNOSIS — K30 INDIGESTION: ICD-10-CM

## 2024-10-28 DIAGNOSIS — M25.551 PAIN OF RIGHT HIP: ICD-10-CM

## 2024-10-28 DIAGNOSIS — I10 ESSENTIAL HYPERTENSION: ICD-10-CM

## 2024-10-28 DIAGNOSIS — R07.9 CHEST PAIN, UNSPECIFIED TYPE: Primary | ICD-10-CM

## 2024-10-28 DIAGNOSIS — G44.211 INTRACTABLE EPISODIC TENSION-TYPE HEADACHE: ICD-10-CM

## 2024-10-28 DIAGNOSIS — E78.2 MIXED HYPERLIPIDEMIA: ICD-10-CM

## 2024-10-28 PROCEDURE — 1159F MED LIST DOCD IN RCRD: CPT | Mod: CPTII,S$GLB,,

## 2024-10-28 PROCEDURE — 1160F RVW MEDS BY RX/DR IN RCRD: CPT | Mod: CPTII,S$GLB,,

## 2024-10-28 PROCEDURE — 99999 PR PBB SHADOW E&M-EST. PATIENT-LVL IV: CPT | Mod: PBBFAC,,,

## 2024-10-28 PROCEDURE — 3078F DIAST BP <80 MM HG: CPT | Mod: CPTII,S$GLB,,

## 2024-10-28 PROCEDURE — 99215 OFFICE O/P EST HI 40 MIN: CPT | Mod: S$GLB,,,

## 2024-10-28 PROCEDURE — 3074F SYST BP LT 130 MM HG: CPT | Mod: CPTII,S$GLB,,

## 2024-10-28 PROCEDURE — 3008F BODY MASS INDEX DOCD: CPT | Mod: CPTII,S$GLB,,

## 2024-11-27 DIAGNOSIS — I10 ESSENTIAL HYPERTENSION: ICD-10-CM

## 2024-12-03 ENCOUNTER — OFFICE VISIT (OUTPATIENT)
Dept: CARDIOLOGY | Facility: CLINIC | Age: 59
End: 2024-12-03
Payer: COMMERCIAL

## 2024-12-03 VITALS
HEART RATE: 68 BPM | WEIGHT: 196.44 LBS | DIASTOLIC BLOOD PRESSURE: 66 MMHG | BODY MASS INDEX: 33.72 KG/M2 | SYSTOLIC BLOOD PRESSURE: 109 MMHG

## 2024-12-03 DIAGNOSIS — E78.2 MIXED HYPERLIPIDEMIA: ICD-10-CM

## 2024-12-03 DIAGNOSIS — I10 ESSENTIAL HYPERTENSION: ICD-10-CM

## 2024-12-03 DIAGNOSIS — Z13.6 ENCOUNTER FOR SCREENING FOR CARDIOVASCULAR DISORDERS: ICD-10-CM

## 2024-12-03 DIAGNOSIS — R07.2 PRECORDIAL PAIN: Primary | ICD-10-CM

## 2024-12-03 LAB
OHS QRS DURATION: 92 MS
OHS QTC CALCULATION: 439 MS

## 2024-12-03 PROCEDURE — 93000 ELECTROCARDIOGRAM COMPLETE: CPT | Mod: S$GLB,,, | Performed by: INTERNAL MEDICINE

## 2024-12-03 PROCEDURE — 3078F DIAST BP <80 MM HG: CPT | Mod: CPTII,S$GLB,, | Performed by: INTERNAL MEDICINE

## 2024-12-03 PROCEDURE — 3074F SYST BP LT 130 MM HG: CPT | Mod: CPTII,S$GLB,, | Performed by: INTERNAL MEDICINE

## 2024-12-03 PROCEDURE — 3008F BODY MASS INDEX DOCD: CPT | Mod: CPTII,S$GLB,, | Performed by: INTERNAL MEDICINE

## 2024-12-03 PROCEDURE — 99999 PR PBB SHADOW E&M-EST. PATIENT-LVL III: CPT | Mod: PBBFAC,,, | Performed by: INTERNAL MEDICINE

## 2024-12-03 PROCEDURE — 99204 OFFICE O/P NEW MOD 45 MIN: CPT | Mod: 25,S$GLB,, | Performed by: INTERNAL MEDICINE

## 2024-12-03 PROCEDURE — 1159F MED LIST DOCD IN RCRD: CPT | Mod: CPTII,S$GLB,, | Performed by: INTERNAL MEDICINE

## 2024-12-03 NOTE — PROGRESS NOTES
Subjective:   12/03/2024     Patient ID:  Phoeneshia L Jacques is a 58 y.o. female who presents for evaulation of Chest Pain, Hypertension, and Establish Care      Comes in for evaluation of chest pain, initially she had an EKG showing sinus rhythm with anterior T-wave inversions, there is an incomplete right bundle-branch block, inferior Q-waves were noted, possible inferior infarct.  A repeat EKG today though shows sinus rhythm and left axis deviation, no RSR prime is present.  There were no EKG changes.  She did undergo a stress test showing no evidence for ischemia.      Blood pressure appears well controlled on metoprolol and hydrochlorothiazide.      Hypercholesterolemia is present.  Last LDL cholesterol 169.  Triglycerides 91, HDL 63.  Family history is positive for coronary artery disease.      She does not smoke cigarettes, she does not have diabetes.    The 10-year ASCVD risk score (Kathia MONROY, et al., 2019) is: 3.4%    Values used to calculate the score:      Age: 58 years      Sex: Female      Is Non- : Yes      Diabetic: No      Tobacco smoker: No      Systolic Blood Pressure: 109 mmHg      Is BP treated: Yes      HDL Cholesterol: 63 mg/dL      Total Cholesterol: 250 mg/dL          Past Medical History:   Diagnosis Date    Anxiety     Arthritis     Asthma     Depression     Hx of psychiatric care     Hypertension     Psychiatric problem     Sleep difficulties     Therapy        Review of patient's allergies indicates:  No Known Allergies      Current Outpatient Medications:     calcium carbonate (TUMS ULTRA) 400 mg calcium (1,000 mg) Chew, Take 1-4 tablets (400-1,600 mg total) by mouth daily as needed (For relief of indigestion)., Disp: 30 tablet, Rfl: 3    hydroCHLOROthiazide (HYDRODIURIL) 12.5 MG Tab, TAKE 1 TABLET(12.5 MG) BY MOUTH EVERY DAY, Disp: 90 tablet, Rfl: 0    ibuprofen (ADVIL,MOTRIN) 600 MG tablet, Take 1 tablet (600 mg total) by mouth 3 (three) times daily., Disp: 30  tablet, Rfl: 3    metoprolol tartrate (LOPRESSOR) 25 MG tablet, Take 0.5 tablets (12.5 mg total) by mouth 2 (two) times daily., Disp: 90 tablet, Rfl: 3    nystatin (MYCOSTATIN) powder, Apply to affected area 3 times daily, Disp: 30 g, Rfl: 1    rosuvastatin (CRESTOR) 40 MG Tab, Take 1 tablet (40 mg total) by mouth every evening., Disp: 30 tablet, Rfl: 6     Objective:   Review of Systems   Cardiovascular:  Negative for chest pain, claudication, cyanosis, dyspnea on exertion, irregular heartbeat, leg swelling, near-syncope, orthopnea, palpitations, paroxysmal nocturnal dyspnea and syncope.         Vitals:    12/03/24 0912   BP: 109/66   Pulse: 68     Wt Readings from Last 3 Encounters:   12/03/24 89.1 kg (196 lb 6.9 oz)   10/28/24 87.1 kg (191 lb 14.6 oz)   09/24/24 85.7 kg (189 lb)     Temp Readings from Last 3 Encounters:   10/28/24 97.5 °F (36.4 °C) (Temporal)   09/17/24 97.9 °F (36.6 °C)   11/24/23 97.3 °F (36.3 °C)     BP Readings from Last 3 Encounters:   12/03/24 109/66   10/28/24 110/70   09/24/24 127/80     Pulse Readings from Last 3 Encounters:   12/03/24 68   10/28/24 65   09/24/24 62             Physical Exam  Vitals reviewed.   Constitutional:       General: She is not in acute distress.     Appearance: She is well-developed.   HENT:      Head: Normocephalic and atraumatic.      Nose: Nose normal.   Eyes:      Conjunctiva/sclera: Conjunctivae normal.      Pupils: Pupils are equal, round, and reactive to light.   Neck:      Vascular: No carotid bruit or JVD.   Cardiovascular:      Rate and Rhythm: Normal rate and regular rhythm.      Pulses: Intact distal pulses.      Heart sounds: Normal heart sounds. No murmur heard.     No friction rub. No gallop.   Pulmonary:      Effort: Pulmonary effort is normal. No respiratory distress.      Breath sounds: Normal breath sounds. No wheezing or rales.   Chest:      Chest wall: No tenderness.   Abdominal:      General: Bowel sounds are normal. There is no distension.       Palpations: Abdomen is soft.      Tenderness: There is no abdominal tenderness.   Musculoskeletal:         General: No tenderness or deformity. Normal range of motion.      Cervical back: Normal range of motion and neck supple.      Right lower leg: No edema.      Left lower leg: No edema.   Skin:     General: Skin is warm and dry.      Findings: No erythema or rash.   Neurological:      Mental Status: She is alert and oriented to person, place, and time.      Cranial Nerves: No cranial nerve deficit.      Motor: No abnormal muscle tone.      Coordination: Coordination normal.   Psychiatric:         Behavior: Behavior normal.         Thought Content: Thought content normal.         Judgment: Judgment normal.           Lab Results   Component Value Date    CHOL 250 (H) 11/24/2023    CHOL 237 (H) 11/08/2022    CHOL 186 04/29/2014     Lab Results   Component Value Date    HDL 63 11/24/2023    HDL 59 11/08/2022    HDL 57 04/29/2014     Lab Results   Component Value Date    LDLCALC 168.8 (H) 11/24/2023    LDLCALC 160.0 (H) 11/08/2022    LDLCALC 114.2 04/29/2014     Lab Results   Component Value Date    ALT 13 11/24/2023    AST 17 11/24/2023    AST 17 11/08/2022    AST 16 09/22/2022     Lab Results   Component Value Date    CREATININE 0.9 11/24/2023    BUN 13 11/24/2023     11/24/2023    K 3.6 11/24/2023    CO2 28 11/24/2023    CO2 29 11/08/2022    CO2 27 09/22/2022     Lab Results   Component Value Date    HGB 12.7 11/24/2023    HCT 40.4 11/24/2023    HCT 41.2 11/08/2022    HCT 40.4 09/22/2022               EKG today shows sinus rhythm with left axis deviation, no ischemia.          Assessment and Plan:     Precordial pain  Comments:  Now resolved   Stress test shows no ischemia  Orders:  -     Ambulatory referral/consult to Cardiology  -     IN OFFICE EKG 12-LEAD (to Muse)    Encounter for screening for cardiovascular disorders  Comments:  Cardiac risk overall seems low by calculation, calcium score to be  obtained    Essential hypertension  Comments:  Well controlled    Mixed hyperlipidemia  Comments:  Has been begun on rosuvastatin 40 mg nightly.         No follow-ups on file.          Future Appointments   Date Time Provider Department Center   12/17/2024  1:00 PM Danial Bridges, PT [facility]   12/23/2024 11:00 AM [facility]

## 2025-01-07 ENCOUNTER — TELEPHONE (OUTPATIENT)
Dept: INTERNAL MEDICINE | Facility: CLINIC | Age: 60
End: 2025-01-07
Payer: COMMERCIAL

## 2025-01-07 NOTE — TELEPHONE ENCOUNTER
----- Message from Biju sent at 1/7/2025 10:55 AM CST -----  Regarding: Scheduling with NP but not Dr?  Contact: Pt [phone]  .[email or web address]     Patient is calling for Medical Advice regarding: Patient says she is frusterated because she wants to see her doctor but is never able to. She said she is always set up with a NP and wants to know why. She would like to speak to office in regard to this, Please call patient back at number provided.    How long has patient had these symptoms:    Pharmacy name and phone#:    Patient wants a call back or thru [facility]: Call    Comments:    Please advise patient replies from provider may take up to 48 hours.

## 2025-01-07 NOTE — TELEPHONE ENCOUNTER
Pt scheduled 1/27/25 @4p w/ pcp. Pt verbalized understanding that appts get booked very quickly and that NP's  the slack.

## 2025-01-15 ENCOUNTER — TELEPHONE (OUTPATIENT)
Dept: INTERNAL MEDICINE | Facility: CLINIC | Age: 60
End: 2025-01-15
Payer: COMMERCIAL

## 2025-01-15 NOTE — TELEPHONE ENCOUNTER
----- Message from Med Assistant Estevez sent at 1/13/2025  4:41 PM CST -----    ----- Message -----  From: Jaja Richards  Sent: 1/13/2025   3:16 PM CST  To: Jessi DSOUZA Staff    .[email or web address]     Patient is calling for Medical Advice regarding: pt called because she has a history of asthma and has been having a tightness in her chest and feels as though she needs an albuterol inhaler called in until her appt on 01/27. Please call and advise. [phone]     How long has patient had these symptoms:      Pharmacy name and phone#:   [facility] #[id number] - [address] AT [address] [address]  Phone: [phone] Fax: [phone]          Patient wants a call back or thru [facility]:    Comments:    Please advise patient replies from provider may take up to 48 hours.

## 2025-01-17 RX ORDER — HYDROCHLOROTHIAZIDE 12.5 MG/1
12.5 TABLET ORAL
Qty: 90 TABLET | Refills: 3 | Status: SHIPPED | OUTPATIENT
Start: 2025-01-17

## 2025-01-17 NOTE — TELEPHONE ENCOUNTER
Care Due:                  Date            Visit Type   Department     Provider  --------------------------------------------------------------------------------                                MYCHART                              FOLLOWUP/OF  MET INTERNAL  Last Visit: 11-      FICE VISIT   MEDICINE       Yuly Bowen                              EP -                              PRIMARY      [facility] INTERNAL  Next Visit: 01-      CARE ([facility])   MEDICINE       Yuly Bowen                                                            Last  Test          Frequency    Reason                     Performed    Due Date  --------------------------------------------------------------------------------    CMP.........  12 months..  hydroCHLOROthiazide......  11- 11-    Health [facility] Embedded Care Due Messages. Reference number: [id number].   1/17/2025 8:06:14 AM CST

## 2025-01-17 NOTE — TELEPHONE ENCOUNTER
Refill Routing Note   Medication(s) are not appropriate for processing by [facility] for the following reason(s):        Required labs outdated    [facility] action(s):  Defer     Requires labs : Yes             Appointments  past 12m or future 3m with PCP    Date Provider   Last Visit   11/24/2023 Yuly Bowen MD   Next Visit   1/27/2025 Yuly Bowen MD   ED visits in past 90 days: 0        Note composed:11:12 AM 01/17/2025

## 2025-01-27 ENCOUNTER — OFFICE VISIT (OUTPATIENT)
Dept: INTERNAL MEDICINE | Facility: CLINIC | Age: 60
End: 2025-01-27
Payer: COMMERCIAL

## 2025-01-27 VITALS
OXYGEN SATURATION: 98 % | BODY MASS INDEX: 33.12 KG/M2 | DIASTOLIC BLOOD PRESSURE: 78 MMHG | HEART RATE: 78 BPM | HEIGHT: 64 IN | SYSTOLIC BLOOD PRESSURE: 120 MMHG | WEIGHT: 194 LBS | TEMPERATURE: 97 F

## 2025-01-27 DIAGNOSIS — Z23 NEED FOR VACCINATION: ICD-10-CM

## 2025-01-27 DIAGNOSIS — Z00.00 ROUTINE MEDICAL EXAM: Primary | ICD-10-CM

## 2025-01-27 DIAGNOSIS — Z23 NEED FOR PNEUMOCOCCAL 20-VALENT CONJUGATE VACCINATION: ICD-10-CM

## 2025-01-27 DIAGNOSIS — F33.2 SEVERE EPISODE OF RECURRENT MAJOR DEPRESSIVE DISORDER, WITHOUT PSYCHOTIC FEATURES: ICD-10-CM

## 2025-01-27 DIAGNOSIS — M25.511 RIGHT SHOULDER PAIN, UNSPECIFIED CHRONICITY: ICD-10-CM

## 2025-01-27 DIAGNOSIS — E66.811 CLASS 1 OBESITY WITH SERIOUS COMORBIDITY AND BODY MASS INDEX (BMI) OF 33.0 TO 33.9 IN ADULT, UNSPECIFIED OBESITY TYPE: ICD-10-CM

## 2025-01-27 PROCEDURE — 3008F BODY MASS INDEX DOCD: CPT | Mod: CPTII,S$GLB,, | Performed by: INTERNAL MEDICINE

## 2025-01-27 PROCEDURE — 99396 PREV VISIT EST AGE 40-64: CPT | Mod: 25,S$GLB,, | Performed by: INTERNAL MEDICINE

## 2025-01-27 PROCEDURE — G0009 ADMIN PNEUMOCOCCAL VACCINE: HCPCS | Mod: S$GLB,,, | Performed by: INTERNAL MEDICINE

## 2025-01-27 PROCEDURE — 3078F DIAST BP <80 MM HG: CPT | Mod: CPTII,S$GLB,, | Performed by: INTERNAL MEDICINE

## 2025-01-27 PROCEDURE — 3044F HG A1C LEVEL LT 7.0%: CPT | Mod: CPTII,S$GLB,, | Performed by: INTERNAL MEDICINE

## 2025-01-27 PROCEDURE — 1159F MED LIST DOCD IN RCRD: CPT | Mod: CPTII,S$GLB,, | Performed by: INTERNAL MEDICINE

## 2025-01-27 PROCEDURE — 1160F RVW MEDS BY RX/DR IN RCRD: CPT | Mod: CPTII,S$GLB,, | Performed by: INTERNAL MEDICINE

## 2025-01-27 PROCEDURE — 90677 PCV20 VACCINE IM: CPT | Mod: S$GLB,,, | Performed by: INTERNAL MEDICINE

## 2025-01-27 PROCEDURE — G0008 ADMIN INFLUENZA VIRUS VAC: HCPCS | Mod: S$GLB,,, | Performed by: INTERNAL MEDICINE

## 2025-01-27 PROCEDURE — 3074F SYST BP LT 130 MM HG: CPT | Mod: CPTII,S$GLB,, | Performed by: INTERNAL MEDICINE

## 2025-01-27 PROCEDURE — 90656 IIV3 VACC NO PRSV 0.5 ML IM: CPT | Mod: S$GLB,,, | Performed by: INTERNAL MEDICINE

## 2025-01-27 PROCEDURE — 99999 PR PBB SHADOW E&M-EST. PATIENT-LVL V: CPT | Mod: PBBFAC,,, | Performed by: INTERNAL MEDICINE

## 2025-01-27 RX ORDER — TIZANIDINE HYDROCHLORIDE 2 MG/1
2 CAPSULE, GELATIN COATED ORAL 3 TIMES DAILY
COMMUNITY
End: 2025-01-27

## 2025-01-27 RX ORDER — FLUTICASONE PROPIONATE 50 MCG
1 SPRAY, SUSPENSION (ML) NASAL NIGHTLY
Qty: 16 G | Refills: 3 | Status: SHIPPED | OUTPATIENT
Start: 2025-01-27

## 2025-01-27 NOTE — PROGRESS NOTES
The patient is a 59 y.o. old female with severe episode of recurrent major depression who presents to the office for a physical.      HEADACHES:  She experiences headaches once or twice weekly, characterized by throbbing pain localized at the top of the head with associated blurry vision and dizziness. The headaches typically resolve within 30 minutes after taking ibuprofen 600mg.    RESPIRATORY AND CARDIAC:  She experiences chest tightness that improves with albuterol use. She reports shortness of breath with physical activity and when rushing. She has light palpitations that resolve with rest. She also reports burning chest pain that improves with carbonated beverages.    MUSCULOSKELETAL:  She reports right arm pain since November of previous year without associated trauma, preventing her from raising the arm. This limitation affects her daily activities, including applying deodorant and washing.    DEPRESSION:  She experiences daily sadness ranging from moderate to severe, occasionally resulting in inability to get out of bed. Previous medications have been ineffective in managing her depression symptoms. She denies suicidal ideation.    CURRENT MEDICATIONS:  She takes hydrochlorothiazide, albuterol inhaler, and ibuprofen 600mg as needed for headaches.    SOCIAL HISTORY:  She denies smoking, alcohol use, and recreational drug use.    ALLERGIES:  She reports experiencing throat tightness but denies wheezing. She denies any known medication allergies.      ROS:  Constitutional: +dizziness  Head: +headaches  Eyes: +vision changes  Respiratory: +shortness of breath, -wheezing  Cardiovascular: +chest pain, +palpitations, +chest tightness  Gastrointestinal: +heartburn  Musculoskeletal: +muscle pain  Psychiatric: +depression, -suicidal ideation       PAST MEDICAL HISTORY  Past Medical History:   Diagnosis Date    Anxiety     Arthritis     Asthma     Depression     Hx of psychiatric care     Hypertension     Psychiatric  problem     Sleep difficulties     Therapy        SURGICAL HISTORY:  Past Surgical History:   Procedure Laterality Date    BREAST BIOPSY      essure      TOTAL HIP ARTHROPLASTY Left 01/23/2019    TUBAL LIGATION           MEDS:  Medcard reviewed and updated    ALLERGIES: Allergy Card reviewed and updated    SOCIAL HISTORY:   The patient is a nonsmoker, denies alcohol or illicit drug use.    PE:   Vitals:  Vitals:    01/27/25 1551   BP: 120/78   Pulse: 78   Temp: 97.4 °F (36.3 °C)       APPEARANCE: Well nourished, well developed, in no acute distress.    EYES: Sclerae anicteric. PERRL. EOMI.      EARS: TM's intact. No retraction or perforation.  Positive fluid in right ear.  NOSE: Mucosa pink. Airway clear.  MOUTH & THROAT: No tonsillar enlargement. No pharyngeal erythema or exudate. No stridor.  NECK: Supple, no thyromegaly.  CHEST: Lungs clear to auscultation with unlabored respirations.  CARDIOVASCULAR: Normal S1, S2. No murmurs. No carotid bruits. No pedal edema.  ABDOMEN: Bowel sounds normal. Not distended. Soft. No tenderness or masses.  MUSCULOSKELETAL:  Normal gait, no cyanosis or clubbing.   SKIN: Normal skin turgor, warm and dry.  NEUROLOGIC: Cranial Nerves: Intact.  PSYCHIATRIC: The patient is oriented to person, place, and time and has a pleasant affect.        ASSESSMENT/PLAN:  Phoeneshia was seen today for annual exam.    Diagnoses and all orders for this visit:    Routine medical exam  -     CBC Auto Differential; Future  -     Comprehensive Metabolic Panel; Future  -     Lipid Panel; Future  -     TSH; Future  -     Hemoglobin A1C; Future  -     Urinalysis; Future    Severe episode of recurrent major depressive disorder, without psychotic features  -     Ambulatory referral/consult to Psychiatry; Future    Class 1 obesity with serious comorbidity and body mass index (BMI) of 33.0 to 33.9 in adult, unspecified obesity type  -     naltrexone-bupropion (CONTRAVE) 8-90 mg TbSR; Take 1 tablet by mouth once  daily for 7 days, THEN 1 tablet 2 (two) times a day for 7 days, THEN 1 tablet 3 (three) times daily for 7 days, THEN 2 tablets 2 (two) times a day for 7 days. Week 3: Take 2 tablets in the morning and 1 tablet in the evening, instead of 1 tablet 3 times per day..    Right shoulder pain, unspecified chronicity  -     Ambulatory referral/consult to Orthopedics; Future    Need for pneumococcal 20-valent conjugate vaccination  -     pneumoc 20-cara conj-dip cr(PF) (PREVNAR-20 (PF)) injection Syrg 0.5 mL    Need for vaccination  -     Influenza - Trivalent - PF (ADULT)    Other orders  -     fluticasone propionate (FLONASE) 50 mcg/actuation nasal spray; 1 spray (50 mcg total) by Each Nostril route every evening.        IMPRESSION:  - Evaluated headaches, considering tension-type etiology and possible vertigo component  - Assessed right shoulder pain and limited range of motion, particularly above 90 degrees and with internal rotation  - Reviewed current medications and discontinued those no longer needed  - Considered weight loss medication (Contrave) given weight gain and depression symptoms  - Opted to check baseline labs before pursuing MRI for headaches  - Recommend nasal steroid spray for possible allergy-related dizziness/ear fluid  - Evaluated depression symptoms and considered medication options    DEPRESSION AND WEIGHT MANAGEMENT:  - Explained Contrave contains Wellbutrin (bupropion), which acts on dopamine receptors unlike typical antidepressants.  - Discussed Wellbutrin's potential side effects, including possible increased libido and weight loss.  - Started Contrave for weight loss and depression:  - Week 1: 1 tablet daily  - Week 2: 1 tablet twice daily  - Week 3: 2 tablets in morning, 1 tablet in evening  - Week 4 and beyond: 2 tablets twice daily  - Continue for 12 weeks total  - Referred to psychiatry for ongoing depression management.  - Follow up after completing 12-week course of Contrave to reassess  depression symptoms and consider continuing bupropion alone if effective.    VERTIGO/DIZZINESS:  - Explained vertigo symptoms and typical treatment approach.  - Phoeneshia to use Flonase nasal spray at bedtime for dizziness symptoms.  - Started Flonase nasal spray at bedtime for dizziness/allergy symptoms.    HEADACHES:  - Recommend considering OTC magnesium supplement for headaches, but avoid taking too much to prevent diarrhea.  - Continued Ibuprofen 600mg as needed for headaches.    PNEUMOCOCCAL VACCINATION:  - Educated on new pneumonia vaccine recommendations for adults 50 and older.  - Prevnar 20 (pneumococcal vaccine) administered in office.    BONE DENSITY:  - Discussed bone density screening, typically starting around age 65.    MEDICATIONS/SUPPLEMENTS:  - Discontinued Tizanidine (muscle relaxer).  - Discontinued statin medication (previously prescribed for elevated cholesterol).  - Discontinued Metoprolol.  - Continued Hydrochlorothiazide for blood pressure.    LABS:  - Fasting labs ordered.    FLU VACCINATION:  - Flu shot administered in office.    SHOULDER PAIN:  - Referred to orthopedics for right shoulder pain and limited range of motion.    CARDIAC HEALTH:  - Follow up on Friday at [facility] for cardiac scoring test.    MAMMOGRAM:  - Follow up on February 5th for mammogram.

## 2025-01-28 ENCOUNTER — LAB VISIT (OUTPATIENT)
Dept: LAB | Facility: HOSPITAL | Age: 60
End: 2025-01-28
Attending: INTERNAL MEDICINE
Payer: COMMERCIAL

## 2025-01-28 DIAGNOSIS — Z00.00 ROUTINE MEDICAL EXAM: ICD-10-CM

## 2025-01-28 LAB
BILIRUB UR QL STRIP: NEGATIVE
CLARITY UR REFRACT.AUTO: CLEAR
COLOR UR AUTO: YELLOW
GLUCOSE UR QL STRIP: NEGATIVE
HGB UR QL STRIP: NEGATIVE
KETONES UR QL STRIP: NEGATIVE
LEUKOCYTE ESTERASE UR QL STRIP: NEGATIVE
NITRITE UR QL STRIP: NEGATIVE
PH UR STRIP: 7 [PH] (ref 5–8)
PROT UR QL STRIP: NEGATIVE
SP GR UR STRIP: 1.02 (ref 1–1.03)
URN SPEC COLLECT METH UR: NORMAL

## 2025-01-28 PROCEDURE — 81003 URINALYSIS AUTO W/O SCOPE: CPT | Performed by: INTERNAL MEDICINE

## 2025-01-29 ENCOUNTER — PATIENT MESSAGE (OUTPATIENT)
Dept: INTERNAL MEDICINE | Facility: CLINIC | Age: 60
End: 2025-01-29
Payer: COMMERCIAL

## 2025-01-30 ENCOUNTER — TELEPHONE (OUTPATIENT)
Dept: INTERNAL MEDICINE | Facility: CLINIC | Age: 60
End: 2025-01-30
Payer: COMMERCIAL

## 2025-01-30 NOTE — TELEPHONE ENCOUNTER
[id number]. For CONTRAVE TAB 8-90MG is denied for not meeting the prior authorization requirement(s).

## 2025-01-30 NOTE — TELEPHONE ENCOUNTER
----- Message from Tanika sent at 1/29/2025  3:18 PM CST -----  Contact: [phone] Patient  .[email or web address]     Patient is calling for Medical Advice regarding: Pt states Ins will not cover following Rx with PA:  naltrexone-bupropion (CONTRAVE) 8-90 mg TbSR 70 tablet 0 1/27/2025 2/24/2025 No  Pt states Pharmacy charging $200.00 of out pocket    How long has patient had these symptoms:    Pharmacy name and phone#:   [facility] #[id number] - [address] AT [address] [address]  Phone: [phone] Fax: [phone]      Patient wants a call back or thru [facility]: call back    Comments:    Please advise patient replies from provider may take up to 48 hours.

## 2025-01-31 ENCOUNTER — HOSPITAL ENCOUNTER (OUTPATIENT)
Dept: RADIOLOGY | Facility: HOSPITAL | Age: 60
Discharge: HOME | End: 2025-01-31
Attending: INTERNAL MEDICINE

## 2025-01-31 DIAGNOSIS — Z13.6 ENCOUNTER FOR SCREENING FOR CARDIOVASCULAR DISORDERS: ICD-10-CM

## 2025-01-31 PROCEDURE — 75571 CT HRT W/O DYE W/CA TEST: CPT | Mod: TC,SP

## 2025-01-31 PROCEDURE — 75571 CT HRT W/O DYE W/CA TEST: CPT | Mod: 26,SP,, | Performed by: RADIOLOGY

## 2025-02-03 ENCOUNTER — TELEPHONE (OUTPATIENT)
Dept: INTERNAL MEDICINE | Facility: CLINIC | Age: 60
End: 2025-02-03
Payer: COMMERCIAL

## 2025-02-03 NOTE — TELEPHONE ENCOUNTER
----- Message from Niranjan sent at 2/3/2025 10:38 AM CST -----  Contact: [phone]  .[email or web address]     Patient is calling for Medical Advice regarding: Pt said she needs a call back she needs a PA for naltrexone-bupropion (CONTRAVE) 8-90 mg TbSR    How long has patient had these symptoms:    Pharmacy name and phone#:  [facility] #[id number] - [address] AT [address] [address]  Phone: [phone] Fax: [phone]        Patient wants a call back or thru [facility]: call back     Comments:    Please advise patient replies from provider may take up to 48 hours.

## 2025-02-05 ENCOUNTER — HOSPITAL ENCOUNTER (OUTPATIENT)
Dept: RADIOLOGY | Facility: OTHER | Age: 60
Discharge: HOME | End: 2025-02-05
Attending: INTERNAL MEDICINE
Payer: COMMERCIAL

## 2025-02-05 DIAGNOSIS — Z12.31 ENCOUNTER FOR SCREENING MAMMOGRAM FOR BREAST CANCER: ICD-10-CM

## 2025-02-05 PROCEDURE — 77063 BREAST TOMOSYNTHESIS BI: CPT | Mod: 26,,, | Performed by: RADIOLOGY

## 2025-02-05 PROCEDURE — 77067 SCR MAMMO BI INCL CAD: CPT | Mod: 26,,, | Performed by: RADIOLOGY

## 2025-02-05 PROCEDURE — 77063 BREAST TOMOSYNTHESIS BI: CPT | Mod: TC

## 2025-02-07 ENCOUNTER — PATIENT MESSAGE (OUTPATIENT)
Dept: SPORTS MEDICINE | Facility: CLINIC | Age: 60
End: 2025-02-07
Payer: COMMERCIAL

## 2025-02-07 DIAGNOSIS — M25.511 RIGHT SHOULDER PAIN, UNSPECIFIED CHRONICITY: Primary | ICD-10-CM

## 2025-02-20 ENCOUNTER — HOSPITAL ENCOUNTER (OUTPATIENT)
Dept: RADIOLOGY | Facility: HOSPITAL | Age: 60
Discharge: HOME | End: 2025-02-20
Attending: NEUROMUSCULOSKELETAL MEDICINE & OMM
Payer: COMMERCIAL

## 2025-02-20 ENCOUNTER — OFFICE VISIT (OUTPATIENT)
Dept: SPORTS MEDICINE | Facility: CLINIC | Age: 60
End: 2025-02-20
Payer: COMMERCIAL

## 2025-02-20 VITALS — HEART RATE: 72 BPM | SYSTOLIC BLOOD PRESSURE: 107 MMHG | DIASTOLIC BLOOD PRESSURE: 69 MMHG

## 2025-02-20 DIAGNOSIS — M67.911 DYSFUNCTION OF RIGHT ROTATOR CUFF: Primary | ICD-10-CM

## 2025-02-20 DIAGNOSIS — M25.511 CHRONIC RIGHT SHOULDER PAIN: ICD-10-CM

## 2025-02-20 DIAGNOSIS — M25.511 RIGHT SHOULDER PAIN, UNSPECIFIED CHRONICITY: ICD-10-CM

## 2025-02-20 DIAGNOSIS — G89.29 CHRONIC RIGHT SHOULDER PAIN: ICD-10-CM

## 2025-02-20 PROCEDURE — 73030 X-RAY EXAM OF SHOULDER: CPT | Mod: TC,PO,RT

## 2025-02-20 RX ORDER — MELOXICAM 15 MG/1
15 TABLET ORAL DAILY
Qty: 20 TABLET | Refills: 0 | Status: SHIPPED | OUTPATIENT
Start: 2025-02-20

## 2025-02-20 NOTE — PROGRESS NOTES
Subjective:     Phoeneshia L Jacques     Chief Complaint   Patient presents with    Right Shoulder - Pain     HPI    Phoeneshia is a right hand dominant 59 y.o. female coming in today for right shoulder pain that began about 1.5 year(s) ago, referred by Dr. Bowen. Pt. describes the pain as a 7/10 achy pain that does not radiate. There was not a fall/injury/ or trauma associated with the onset of symptoms. The pain is better with rest, ibuprofen and worse with raising arm. Pt reports significant difficulty with raising her arm due to pain. Pt. Denies any other musculoskeletal complaints at this time.     Joint instability? no  Mechanical locking/clicking? no  Affecting ADL's? yes  Affecting sleep? no    Occupation: not working    PAST MEDICAL HISTORY:   Past Medical History:   Diagnosis Date    Anxiety     Arthritis     Asthma     Depression     Hx of psychiatric care     Hypertension     Psychiatric problem     Sleep difficulties     Therapy      PAST SURGICAL HISTORY:   Past Surgical History:   Procedure Laterality Date    BREAST BIOPSY      essure      TOTAL HIP ARTHROPLASTY Left 01/23/2019    TUBAL LIGATION       FAMILY HISTORY:   Family History   Problem Relation Name Age of Onset    Depression Mother      Bipolar disorder Mother      Schizophrenia Mother      Heart disease Mother      Hypertension Mother      Cancer Paternal Aunt      Bipolar disorder Brother      Schizophrenia Brother      Learning disabilities Paternal Uncle      Learning disabilities Son       SOCIAL HISTORY: Social History[1]    MEDICATIONS: Current Medications[2]    ALLERGIES: Review of patient's allergies indicates:  No Known Allergies    Reviewed office visit on 1/27/25 with Dr. Bowen:  She reports right arm pain since November of previous year without associated trauma, preventing her from raising the arm. This limitation affects her daily activities, including applying deodorant and washing.     Objective:     VITAL SIGNS: BP  107/69 (Patient Position: Sitting)   Pulse 72    General    Vitals reviewed.  Constitutional: She is oriented to person, place, and time. She appears well-developed and well-nourished.   Neurological: She is alert and oriented to person, place, and time.   Psychiatric: She has a normal mood and affect. Her behavior is normal.               MUSCULOSKELETAL EXAM  Shoulder: right SHOULDER  The affected shoulder is compared to the contralateral shoulder.    Observation:      SHOULDER  No ecchymosis, edema, or erythema throughout the shoulder girdle.  No sternal, clavicular, or acromial deformities bilaterally.  No atrophy of the pectorals, deltoids, supraspinatus, infraspinatus, or biceps bilaterally.  No asymmetry of shoulders bilaterally.    Posture:  Increased thoracic kyphosis and Anterior head carriage    ROM (* = with pain):  CERVICAL SPINE  Full AROM in flexion, extension, sidebending, and rotation.    SHOULDER  Active flexion to 180° on left and 140° on right*(passive ROM to 160 with hard end feel)..  Active abduction to 180° on left and 140° on right* (passive ROM to 160 with hard end feel).  Active internal rotation to T12 on left and unable to perform on right*    Active external rotation to T4 on left and T2 on right*.    No scapular dyskinesia or winging.    Tenderness:  No tenderness at the SC or AC joint  No tenderness over the clavicle   + mild tenderness over biceps tendon at the bicipital groove  + tenderness over subacromial space    Strength Testing (* = with pain):  Deltoid - 5/5 on left and 5/5 on right  Biceps - 5/5 on left and 5/5 on right  Triceps - 5/5 on left and 5/5 on right  Wrist extension - 5/5 on left and 5/5 on right  Wrist flexion - 5/5 on left and 5/5 on right   - 5/5 on left and 5/5 on right  Finger extension - 5/5 on left and 5/5 on right  Finger abduction - 5/5 on left and 5/5 on right    Special Tests:  Empty can test - positive for pain and partial weakness  Full can test -  positive for pain and partial weakness  Bear hug test - positive for pain and partial weakness  Belly press test - positive for pain and partial weakness  Resisted internal rotation - positive for pain and partial weakness  Resisted external rotation - positive for pain and partial weakness    Neer's test - positive  Hawkin's-Fausto test - positive  Cross-arm test- negative    Sulcus sign - none  AP load and shift laxity - none    Speed's test - negative  Yergason's test - negative    Neurovascular Exam:  Spurlings test - negative bilaterally  2+ radial pulses bilaterally  Sensation intact to light touch in the distal median, radial, and ulnar nerve distributions bilaterally.  2+/4 reflexes at triceps, biceps, and brachioradialis  Capillary refill intact <2 seconds in all digits bilaterally    IMAGIN. X-ray ordered due to right shoulder pain. (AP, scapular Y, and axillary views) taken today.   2. X-ray images were reviewed personally by me and then directly with patient.  3. FINDINGS: X-ray images obtained demonstrate no acute fracture, dislocation or osseous destructive process. Joint spaces and alignment are maintained. Surrounding soft tissues show no significant abnormalities.   4. IMPRESSION: No pathology or irregularities appreciated.     Assessment:      Encounter Diagnoses   Name Primary?    Dysfunction of right rotator cuff Yes    Chronic right shoulder pain         Plan:   1. Chronic right shoulder pain with associated rotator cuff dysfunction and adhesive capsulitis on physical exam today.  Right shoulder MRI ordered for further evaluation  - passive range of motion exercises given to prevent further stiffness  - Rx given for Meloxicam 15 mg po qday x 14 days then prn for pain control. Pt. Advised to avoid all other NSAIDS while on this medication.  -  X-ray images of right shoulder taken today (AP, scapular Y, and axillary views) showed no significant abnormalities. Images were personally reviewed  with patient.    2.  Pt. Given the following HEP:  A) Pendulum exercises: move dangling arm in small circles clockwise for 10 reps and counterclockwise for 10 reps, 1-2 times daily  B) Wall crawl shoudler ROM exercises in both the forward flexion and abduction planes. Repeat 10-15 times, twice daily. Handout given.  C) Passive shoulder range of motion exercise: move arm in the forward flexion plane with other arm to end range of motion and in abduction plane with a broom stick to end range of motion:Repeat 10-15 times, twice daily. Handout given.    [address]73 HOME EXERCISE PROGRAM (HEP):  The patient was taught a homegoing physical therapy regimen as described above. The patient demonstrated understanding of the exercises and proper technique of their execution. This interaction took 15 minutes.     3. Follow-up in 2 weeks for reevaluation for re-evaluation and review of upcoming right shoulder MRI via virtual visit    4. Patient agreeable to today's plan and all questions were answered    This note is dictated using the M*Modal Fluency Direct word recognition program. There are word recognition mistakes that are occasionally missed on review.                 [1]   Social History  Socioeconomic History    Marital status:      Spouse name: Guillermo    Number of children: 4   Occupational History     Employer:  NOT EMPLOYED   Tobacco Use    Smoking status: Never    Smokeless tobacco: Never   Substance and Sexual Activity    Alcohol use: No     Alcohol/week: 0.0 standard drinks of alcohol    Drug use: No    Sexual activity: Yes     Partners: Male     Social Drivers of Health     Financial Resource Strain: Medium Risk (2/15/2025)    Overall Financial Resource Strain (CARDIA)     Difficulty of Paying Living Expenses: Somewhat hard   Food Insecurity: Food Insecurity Present (2/15/2025)    Hunger Vital Sign     Worried About Running Out of Food in the Last Year: Sometimes true     Ran Out of Food in the Last Year: Sometimes  true   Transportation Needs: No Transportation Needs (2/15/2025)    PRAPARE - Transportation     Lack of Transportation (Medical): No     Lack of Transportation (Non-Medical): No   Physical Activity: Inactive (2/15/2025)    Exercise Vital Sign     Days of Exercise per Week: 0 days     Minutes of Exercise per Session: 0 min   Stress: Stress Concern Present (2/15/2025)    [employer] - Occupational Stress Questionnaire     Feeling of Stress : Very much   Housing Stability: Low Risk  (2/15/2025)    Housing Stability Vital Sign     Unable to Pay for Housing in the Last Year: No     Number of Times Moved in the Last Year: 0     Homeless in the Last Year: No   [2]   Current Outpatient Medications:     albuterol (PROAIR HFA) 90 mcg/actuation inhaler, Inhale 2 puffs into the lungs every 6 (six) hours as needed for Wheezing. Rescue, Disp: 8 g, Rfl: 3    hydroCHLOROthiazide (HYDRODIURIL) 12.5 MG Tab, TAKE 1 TABLET(12.5 MG) BY MOUTH EVERY DAY, Disp: 90 tablet, Rfl: 3    rosuvastatin (CRESTOR) 40 MG Tab, Take 1 tablet (40 mg total) by mouth every evening., Disp: 30 tablet, Rfl: 6    calcium carbonate (TUMS ULTRA) 400 mg calcium (1,000 mg) Chew, Take 1-4 tablets (400-1,600 mg total) by mouth daily as needed (For relief of indigestion). (Patient not taking: Reported on 2/20/2025), Disp: 30 tablet, Rfl: 3    fluticasone propionate (FLONASE) 50 mcg/actuation nasal spray, 1 spray (50 mcg total) by Each Nostril route every evening. (Patient not taking: Reported on 2/20/2025), Disp: 16 g, Rfl: 3    meloxicam (MOBIC) 15 MG tablet, Take 1 tablet (15 mg total) by mouth once daily., Disp: 20 tablet, Rfl: 0    metoprolol tartrate (LOPRESSOR) 25 MG tablet, Take 0.5 tablets (12.5 mg total) by mouth 2 (two) times daily. (Patient not taking: Reported on 2/20/2025), Disp: 90 tablet, Rfl: 3    naltrexone-bupropion (CONTRAVE) 8-90 mg TbSR, Take 1 tablet by mouth once daily for 7 days, THEN 1 tablet 2 (two) times a day  for 7 days, THEN 1 tablet 3 (three) times daily for 7 days, THEN 2 tablets 2 (two) times a day for 7 days. Week 3: Take 2 tablets in the morning and 1 tablet in the evening, instead of 1 tablet 3 times per day.. (Patient not taking: Reported on 2/20/2025), Disp: 70 tablet, Rfl: 0

## 2025-04-10 RX ORDER — HYDROCHLOROTHIAZIDE 12.5 MG/1
12.5 TABLET ORAL DAILY
Qty: 90 TABLET | Refills: 3 | Status: SHIPPED | OUTPATIENT
Start: 2025-04-10

## 2025-04-10 NOTE — TELEPHONE ENCOUNTER
No care due was identified.  [facility] Embedded Care Due Messages. Reference number: [id number].   4/10/2025 10:27:12 AM CDT

## 2025-04-10 NOTE — TELEPHONE ENCOUNTER
Refill Decision Note   Phoeneshisuzanne Kirby  is requesting a refill authorization.  Brief Assessment and Rationale for Refill:  Approve     Medication Therapy Plan:         Comments:     Note composed:11:49 AM 04/10/2025

## 2025-04-16 RX ORDER — ALBUTEROL SULFATE 90 UG/1
2 INHALANT RESPIRATORY (INHALATION) EVERY 6 HOURS PRN
Qty: 8.5 G | Refills: 3 | Status: SHIPPED | OUTPATIENT
Start: 2025-04-16

## 2025-04-16 NOTE — TELEPHONE ENCOUNTER
Refill Routing Note   Medication(s) are not appropriate for processing by [facility] for the following reason(s):        New or recently adjusted medication    [facility] action(s):  Defer               Appointments  past 12m or future 3m with PCP    Date Provider   Last Visit   1/27/2025 Yuly Bowen MD   Next Visit   Visit date not found Yuly Bowen MD   ED visits in past 90 days: 0        Note composed:10:35 AM 04/16/2025

## 2025-04-16 NOTE — TELEPHONE ENCOUNTER
No care due was identified.  [facility] Embedded Care Due Messages. Reference number: [id number].   4/16/2025 8:28:39 AM CDT

## 2025-07-23 RX ORDER — ALBUTEROL SULFATE 90 UG/1
2 INHALANT RESPIRATORY (INHALATION) EVERY 6 HOURS PRN
Qty: 25.5 G | Refills: 1 | Status: SHIPPED | OUTPATIENT
Start: 2025-07-23

## 2025-07-23 NOTE — TELEPHONE ENCOUNTER
Refill Decision Note   Phoeneshisuzanne Kirby  is requesting a refill authorization.  Brief Assessment and Rationale for Refill:  Approve     Medication Therapy Plan:         Comments:     Note composed:10:14 AM 07/23/2025

## 2025-07-23 NOTE — TELEPHONE ENCOUNTER
No care due was identified.  [facility] Embedded Care Due Messages. Reference number: [id number].   7/23/2025 7:58:06 AM CDT